# Patient Record
Sex: FEMALE | Race: WHITE | NOT HISPANIC OR LATINO
[De-identification: names, ages, dates, MRNs, and addresses within clinical notes are randomized per-mention and may not be internally consistent; named-entity substitution may affect disease eponyms.]

---

## 2017-01-20 ENCOUNTER — APPOINTMENT (OUTPATIENT)
Dept: INTERNAL MEDICINE | Facility: CLINIC | Age: 77
End: 2017-01-20

## 2017-01-20 ENCOUNTER — LABORATORY RESULT (OUTPATIENT)
Age: 77
End: 2017-01-20

## 2017-01-20 VITALS — TEMPERATURE: 98.2 F | SYSTOLIC BLOOD PRESSURE: 108 MMHG | OXYGEN SATURATION: 97 % | DIASTOLIC BLOOD PRESSURE: 80 MMHG

## 2017-01-20 VITALS — DIASTOLIC BLOOD PRESSURE: 70 MMHG | SYSTOLIC BLOOD PRESSURE: 100 MMHG

## 2017-01-21 LAB
ALBUMIN SERPL ELPH-MCNC: 4.3 G/DL
ALP BLD-CCNC: 71 U/L
ALT SERPL-CCNC: 18 U/L
ANION GAP SERPL CALC-SCNC: 19 MMOL/L
APPEARANCE: CLEAR
AST SERPL-CCNC: 26 U/L
BACTERIA: NEGATIVE
BASOPHILS # BLD AUTO: 0.04 K/UL
BASOPHILS NFR BLD AUTO: 0.7 %
BILIRUB SERPL-MCNC: 0.5 MG/DL
BILIRUBIN URINE: NEGATIVE
BLOOD URINE: NEGATIVE
BUN SERPL-MCNC: 23 MG/DL
CALCIUM SERPL-MCNC: 10.1 MG/DL
CHLORIDE SERPL-SCNC: 99 MMOL/L
CO2 SERPL-SCNC: 21 MMOL/L
COLOR: YELLOW
CREAT SERPL-MCNC: 0.98 MG/DL
EOSINOPHIL # BLD AUTO: 0.13 K/UL
EOSINOPHIL NFR BLD AUTO: 2.2 %
GLUCOSE QUALITATIVE U: NORMAL MG/DL
GLUCOSE SERPL-MCNC: 93 MG/DL
HCT VFR BLD CALC: 47.2 %
HGB BLD-MCNC: 15.5 G/DL
HYALINE CASTS: 1 /LPF
IMM GRANULOCYTES NFR BLD AUTO: 0.3 %
KETONES URINE: NEGATIVE
LEUKOCYTE ESTERASE URINE: NEGATIVE
LYMPHOCYTES # BLD AUTO: 1.42 K/UL
LYMPHOCYTES NFR BLD AUTO: 23.8 %
MAN DIFF?: NORMAL
MCHC RBC-ENTMCNC: 29.2 PG
MCHC RBC-ENTMCNC: 32.8 GM/DL
MCV RBC AUTO: 88.9 FL
MICROSCOPIC-UA: NORMAL
MONOCYTES # BLD AUTO: 0.58 K/UL
MONOCYTES NFR BLD AUTO: 9.7 %
NEUTROPHILS # BLD AUTO: 3.78 K/UL
NEUTROPHILS NFR BLD AUTO: 63.3 %
NITRITE URINE: NEGATIVE
PH URINE: 5.5
PLATELET # BLD AUTO: 262 K/UL
POTASSIUM SERPL-SCNC: 4.5 MMOL/L
PROT SERPL-MCNC: 7.9 G/DL
PROTEIN URINE: NEGATIVE MG/DL
RBC # BLD: 5.31 M/UL
RBC # FLD: 13.5 %
RED BLOOD CELLS URINE: 1 /HPF
SAVE SPECIMEN: NORMAL
SODIUM SERPL-SCNC: 139 MMOL/L
SPECIFIC GRAVITY URINE: 1.02
SQUAMOUS EPITHELIAL CELLS: 1 /HPF
T4 FREE SERPL-MCNC: 1 NG/DL
TSH SERPL-ACNC: 3.6 UIU/ML
UROBILINOGEN URINE: NORMAL MG/DL
WBC # FLD AUTO: 5.97 K/UL
WHITE BLOOD CELLS URINE: 2 /HPF

## 2017-02-02 RX ORDER — ROSUVASTATIN CALCIUM 10 MG/1
10 TABLET, FILM COATED ORAL
Qty: 1 | Refills: 0 | Status: DISCONTINUED | COMMUNITY
Start: 2017-01-20 | End: 2017-02-02

## 2017-06-30 ENCOUNTER — APPOINTMENT (OUTPATIENT)
Dept: ENDOCRINOLOGY | Facility: CLINIC | Age: 77
End: 2017-06-30

## 2017-06-30 VITALS
WEIGHT: 170 LBS | OXYGEN SATURATION: 98 % | SYSTOLIC BLOOD PRESSURE: 100 MMHG | BODY MASS INDEX: 27.32 KG/M2 | HEART RATE: 69 BPM | HEIGHT: 66 IN | DIASTOLIC BLOOD PRESSURE: 66 MMHG

## 2017-06-30 RX ORDER — DENOSUMAB 60 MG/ML
60 INJECTION SUBCUTANEOUS
Qty: 1 | Refills: 0 | Status: COMPLETED | OUTPATIENT
Start: 2017-06-30

## 2017-06-30 RX ADMIN — DENOSUMAB 0 MG/ML: 60 INJECTION SUBCUTANEOUS at 00:00

## 2017-07-27 ENCOUNTER — NON-APPOINTMENT (OUTPATIENT)
Age: 77
End: 2017-07-27

## 2017-07-27 ENCOUNTER — APPOINTMENT (OUTPATIENT)
Dept: INTERNAL MEDICINE | Facility: CLINIC | Age: 77
End: 2017-07-27
Payer: MEDICARE

## 2017-07-27 VITALS
DIASTOLIC BLOOD PRESSURE: 80 MMHG | TEMPERATURE: 97.2 F | WEIGHT: 165 LBS | BODY MASS INDEX: 26.63 KG/M2 | SYSTOLIC BLOOD PRESSURE: 126 MMHG

## 2017-07-27 VITALS — SYSTOLIC BLOOD PRESSURE: 130 MMHG | DIASTOLIC BLOOD PRESSURE: 80 MMHG

## 2017-07-27 PROCEDURE — 99214 OFFICE O/P EST MOD 30 MIN: CPT | Mod: 25

## 2017-07-27 PROCEDURE — 93000 ELECTROCARDIOGRAM COMPLETE: CPT

## 2017-07-27 RX ORDER — CLOBETASOL PROPIONATE 0.5 MG/ML
0.05 SOLUTION TOPICAL
Qty: 50 | Refills: 0 | Status: DISCONTINUED | COMMUNITY
Start: 2017-01-09 | End: 2017-07-27

## 2017-08-12 ENCOUNTER — LABORATORY RESULT (OUTPATIENT)
Age: 77
End: 2017-08-12

## 2017-09-18 LAB — HEMOCCULT STL QL IA: NEGATIVE

## 2017-10-04 ENCOUNTER — APPOINTMENT (OUTPATIENT)
Dept: OBGYN | Facility: CLINIC | Age: 77
End: 2017-10-04
Payer: MEDICARE

## 2017-10-04 PROCEDURE — G0101: CPT

## 2017-10-17 ENCOUNTER — APPOINTMENT (OUTPATIENT)
Dept: INTERNAL MEDICINE | Facility: CLINIC | Age: 77
End: 2017-10-17
Payer: MEDICARE

## 2017-10-17 VITALS — TEMPERATURE: 98.9 F

## 2017-10-17 PROCEDURE — G0008: CPT

## 2017-10-17 PROCEDURE — 90688 IIV4 VACCINE SPLT 0.5 ML IM: CPT

## 2017-11-16 ENCOUNTER — NON-APPOINTMENT (OUTPATIENT)
Age: 77
End: 2017-11-16

## 2017-11-16 ENCOUNTER — APPOINTMENT (OUTPATIENT)
Dept: INTERNAL MEDICINE | Facility: CLINIC | Age: 77
End: 2017-11-16
Payer: MEDICARE

## 2017-11-16 VITALS
WEIGHT: 166 LBS | BODY MASS INDEX: 26.79 KG/M2 | SYSTOLIC BLOOD PRESSURE: 110 MMHG | TEMPERATURE: 97.6 F | DIASTOLIC BLOOD PRESSURE: 80 MMHG

## 2017-11-16 PROCEDURE — 93000 ELECTROCARDIOGRAM COMPLETE: CPT

## 2017-11-16 PROCEDURE — 99214 OFFICE O/P EST MOD 30 MIN: CPT | Mod: 25

## 2017-12-18 ENCOUNTER — APPOINTMENT (OUTPATIENT)
Dept: INTERNAL MEDICINE | Facility: CLINIC | Age: 77
End: 2017-12-18

## 2018-01-17 ENCOUNTER — APPOINTMENT (OUTPATIENT)
Dept: ENDOCRINOLOGY | Facility: CLINIC | Age: 78
End: 2018-01-17
Payer: MEDICARE

## 2018-01-17 VITALS
WEIGHT: 162 LBS | HEIGHT: 65.6 IN | SYSTOLIC BLOOD PRESSURE: 112 MMHG | OXYGEN SATURATION: 98 % | BODY MASS INDEX: 26.35 KG/M2 | HEART RATE: 71 BPM | DIASTOLIC BLOOD PRESSURE: 72 MMHG

## 2018-01-17 PROCEDURE — ZZZZZ: CPT

## 2018-01-17 PROCEDURE — 99214 OFFICE O/P EST MOD 30 MIN: CPT | Mod: 25

## 2018-01-17 PROCEDURE — 96372 THER/PROPH/DIAG INJ SC/IM: CPT

## 2018-01-17 PROCEDURE — 77080 DXA BONE DENSITY AXIAL: CPT | Mod: GA

## 2018-01-17 RX ORDER — DENOSUMAB 60 MG/ML
60 INJECTION SUBCUTANEOUS
Qty: 0 | Refills: 0 | Status: COMPLETED | OUTPATIENT
Start: 2018-01-17

## 2018-01-17 RX ADMIN — DENOSUMAB 0 MG/ML: 60 INJECTION SUBCUTANEOUS at 00:00

## 2018-01-28 ENCOUNTER — FORM ENCOUNTER (OUTPATIENT)
Age: 78
End: 2018-01-28

## 2018-01-29 ENCOUNTER — APPOINTMENT (OUTPATIENT)
Dept: CT IMAGING | Facility: CLINIC | Age: 78
End: 2018-01-29
Payer: MEDICARE

## 2018-01-29 ENCOUNTER — OUTPATIENT (OUTPATIENT)
Dept: OUTPATIENT SERVICES | Facility: HOSPITAL | Age: 78
LOS: 1 days | End: 2018-01-29
Payer: MEDICARE

## 2018-01-29 DIAGNOSIS — R91.1 SOLITARY PULMONARY NODULE: ICD-10-CM

## 2018-01-29 PROCEDURE — 71250 CT THORAX DX C-: CPT | Mod: 26

## 2018-01-29 PROCEDURE — 71250 CT THORAX DX C-: CPT

## 2018-03-12 ENCOUNTER — RESULT CHARGE (OUTPATIENT)
Age: 78
End: 2018-03-12

## 2018-03-12 ENCOUNTER — APPOINTMENT (OUTPATIENT)
Dept: INTERNAL MEDICINE | Facility: CLINIC | Age: 78
End: 2018-03-12
Payer: MEDICARE

## 2018-03-12 VITALS
TEMPERATURE: 98.8 F | WEIGHT: 164 LBS | SYSTOLIC BLOOD PRESSURE: 114 MMHG | HEIGHT: 65 IN | DIASTOLIC BLOOD PRESSURE: 68 MMHG | BODY MASS INDEX: 27.32 KG/M2

## 2018-03-12 DIAGNOSIS — Z87.898 PERSONAL HISTORY OF OTHER SPECIFIED CONDITIONS: ICD-10-CM

## 2018-03-12 DIAGNOSIS — S92.352A DISPLACED FRACTURE OF FIFTH METATARSAL BONE, LEFT FOOT, INITIAL ENCOUNTER FOR CLOSED FRACTURE: ICD-10-CM

## 2018-03-12 DIAGNOSIS — M25.512 PAIN IN LEFT SHOULDER: ICD-10-CM

## 2018-03-12 DIAGNOSIS — Z87.09 PERSONAL HISTORY OF OTHER DISEASES OF THE RESPIRATORY SYSTEM: ICD-10-CM

## 2018-03-12 DIAGNOSIS — I07.1 RHEUMATIC TRICUSPID INSUFFICIENCY: ICD-10-CM

## 2018-03-12 DIAGNOSIS — M25.511 PAIN IN RIGHT SHOULDER: ICD-10-CM

## 2018-03-12 DIAGNOSIS — G51.8 OTHER DISORDERS OF FACIAL NERVE: ICD-10-CM

## 2018-03-12 DIAGNOSIS — Z87.39 PERSONAL HISTORY OF OTHER DISEASES OF THE MUSCULOSKELETAL SYSTEM AND CONNECTIVE TISSUE: ICD-10-CM

## 2018-03-12 DIAGNOSIS — M75.81 OTHER SHOULDER LESIONS, RIGHT SHOULDER: ICD-10-CM

## 2018-03-12 DIAGNOSIS — Z91.041 RADIOGRAPHIC DYE ALLERGY STATUS: ICD-10-CM

## 2018-03-12 DIAGNOSIS — R42 DIZZINESS AND GIDDINESS: ICD-10-CM

## 2018-03-12 DIAGNOSIS — M79.672 PAIN IN LEFT FOOT: ICD-10-CM

## 2018-03-12 DIAGNOSIS — R10.13 EPIGASTRIC PAIN: ICD-10-CM

## 2018-03-12 DIAGNOSIS — Z92.29 PERSONAL HISTORY OF OTHER DRUG THERAPY: ICD-10-CM

## 2018-03-12 DIAGNOSIS — Z86.2 PERSONAL HISTORY OF DISEASES OF THE BLOOD AND BLOOD-FORMING ORGANS AND CERTAIN DISORDERS INVOLVING THE IMMUNE MECHANISM: ICD-10-CM

## 2018-03-12 DIAGNOSIS — M67.912 UNSPECIFIED DISORDER OF SYNOVIUM AND TENDON, LEFT SHOULDER: ICD-10-CM

## 2018-03-12 DIAGNOSIS — Z86.69 PERSONAL HISTORY OF OTHER DISEASES OF THE NERVOUS SYSTEM AND SENSE ORGANS: ICD-10-CM

## 2018-03-12 PROCEDURE — 90471 IMMUNIZATION ADMIN: CPT | Mod: GY

## 2018-03-12 PROCEDURE — 93000 ELECTROCARDIOGRAM COMPLETE: CPT

## 2018-03-12 PROCEDURE — 99214 OFFICE O/P EST MOD 30 MIN: CPT | Mod: 25

## 2018-03-12 PROCEDURE — 36415 COLL VENOUS BLD VENIPUNCTURE: CPT

## 2018-03-12 PROCEDURE — 90715 TDAP VACCINE 7 YRS/> IM: CPT | Mod: GY

## 2018-03-14 LAB
ALBUMIN SERPL ELPH-MCNC: 4.6 G/DL
ALP BLD-CCNC: 60 U/L
ALT SERPL-CCNC: 14 U/L
ANION GAP SERPL CALC-SCNC: 12 MMOL/L
AST SERPL-CCNC: 23 U/L
BASOPHILS # BLD AUTO: 0.04 K/UL
BASOPHILS NFR BLD AUTO: 0.6 %
BILIRUB SERPL-MCNC: 0.5 MG/DL
BUN SERPL-MCNC: 18 MG/DL
CALCIUM SERPL-MCNC: 9.6 MG/DL
CHLORIDE SERPL-SCNC: 104 MMOL/L
CO2 SERPL-SCNC: 25 MMOL/L
CREAT SERPL-MCNC: 0.82 MG/DL
EOSINOPHIL # BLD AUTO: 0.12 K/UL
EOSINOPHIL NFR BLD AUTO: 1.8 %
GLUCOSE SERPL-MCNC: 100 MG/DL
HCT VFR BLD CALC: 44.8 %
HGB BLD-MCNC: 14.8 G/DL
IMM GRANULOCYTES NFR BLD AUTO: 0.2 %
LYMPHOCYTES # BLD AUTO: 1.98 K/UL
LYMPHOCYTES NFR BLD AUTO: 29.9 %
MAN DIFF?: NORMAL
MCHC RBC-ENTMCNC: 29.8 PG
MCHC RBC-ENTMCNC: 33 GM/DL
MCV RBC AUTO: 90.3 FL
MONOCYTES # BLD AUTO: 0.5 K/UL
MONOCYTES NFR BLD AUTO: 7.5 %
NEUTROPHILS # BLD AUTO: 3.98 K/UL
NEUTROPHILS NFR BLD AUTO: 60 %
PLATELET # BLD AUTO: 241 K/UL
POTASSIUM SERPL-SCNC: 4.7 MMOL/L
PROT SERPL-MCNC: 7.6 G/DL
RBC # BLD: 4.96 M/UL
RBC # FLD: 13.7 %
SAVE SPECIMEN: NORMAL
SODIUM SERPL-SCNC: 141 MMOL/L
WBC # FLD AUTO: 6.63 K/UL

## 2018-03-15 ENCOUNTER — MESSAGE (OUTPATIENT)
Age: 78
End: 2018-03-15

## 2018-07-18 ENCOUNTER — APPOINTMENT (OUTPATIENT)
Dept: ENDOCRINOLOGY | Facility: CLINIC | Age: 78
End: 2018-07-18
Payer: MEDICARE

## 2018-07-18 VITALS
HEART RATE: 72 BPM | SYSTOLIC BLOOD PRESSURE: 110 MMHG | DIASTOLIC BLOOD PRESSURE: 70 MMHG | BODY MASS INDEX: 27.99 KG/M2 | OXYGEN SATURATION: 98 % | WEIGHT: 168 LBS | HEIGHT: 65 IN

## 2018-07-18 PROCEDURE — 99214 OFFICE O/P EST MOD 30 MIN: CPT | Mod: 25

## 2018-07-18 PROCEDURE — 96372 THER/PROPH/DIAG INJ SC/IM: CPT

## 2018-07-18 RX ORDER — DENOSUMAB 60 MG/ML
60 INJECTION SUBCUTANEOUS
Qty: 0 | Refills: 0 | Status: COMPLETED | OUTPATIENT
Start: 2018-07-18

## 2018-07-18 RX ORDER — ESOMEPRAZOLE MAGNESIUM 40 MG/1
40 CAPSULE, DELAYED RELEASE ORAL
Qty: 90 | Refills: 3 | Status: DISCONTINUED | COMMUNITY
Start: 2017-07-27 | End: 2018-07-18

## 2018-07-18 RX ADMIN — DENOSUMAB 0 MG/ML: 60 INJECTION SUBCUTANEOUS at 00:00

## 2018-08-27 ENCOUNTER — APPOINTMENT (OUTPATIENT)
Dept: INTERNAL MEDICINE | Facility: CLINIC | Age: 78
End: 2018-08-27
Payer: MEDICARE

## 2018-08-27 ENCOUNTER — NON-APPOINTMENT (OUTPATIENT)
Age: 78
End: 2018-08-27

## 2018-08-27 VITALS — SYSTOLIC BLOOD PRESSURE: 120 MMHG | DIASTOLIC BLOOD PRESSURE: 80 MMHG

## 2018-08-27 VITALS
SYSTOLIC BLOOD PRESSURE: 120 MMHG | TEMPERATURE: 99.1 F | DIASTOLIC BLOOD PRESSURE: 80 MMHG | WEIGHT: 167 LBS | BODY MASS INDEX: 27.79 KG/M2

## 2018-08-27 PROCEDURE — 99214 OFFICE O/P EST MOD 30 MIN: CPT | Mod: 25

## 2018-08-27 PROCEDURE — 93000 ELECTROCARDIOGRAM COMPLETE: CPT

## 2018-09-15 ENCOUNTER — RX RENEWAL (OUTPATIENT)
Age: 78
End: 2018-09-15

## 2019-01-23 ENCOUNTER — APPOINTMENT (OUTPATIENT)
Dept: ENDOCRINOLOGY | Facility: CLINIC | Age: 79
End: 2019-01-23
Payer: MEDICARE

## 2019-01-23 VITALS
HEIGHT: 65 IN | WEIGHT: 169 LBS | SYSTOLIC BLOOD PRESSURE: 110 MMHG | HEART RATE: 75 BPM | BODY MASS INDEX: 28.16 KG/M2 | OXYGEN SATURATION: 98 % | DIASTOLIC BLOOD PRESSURE: 64 MMHG

## 2019-01-23 PROCEDURE — 96372 THER/PROPH/DIAG INJ SC/IM: CPT

## 2019-01-23 PROCEDURE — 99214 OFFICE O/P EST MOD 30 MIN: CPT | Mod: 25

## 2019-01-23 RX ORDER — DENOSUMAB 60 MG/ML
60 INJECTION SUBCUTANEOUS
Qty: 1 | Refills: 0 | Status: COMPLETED | OUTPATIENT
Start: 2019-01-23

## 2019-01-23 RX ADMIN — DENOSUMAB 0 MG/ML: 60 INJECTION SUBCUTANEOUS at 00:00

## 2019-01-23 NOTE — PHYSICAL EXAM
[Alert] : alert [No Acute Distress] : no acute distress [Well Nourished] : well nourished [Well Developed] : well developed [Normal Sclera/Conjunctiva] : normal sclera/conjunctiva [No Proptosis] : no proptosis [Normal Oropharynx] : the oropharynx was normal [Thyroid Not Enlarged] : the thyroid was not enlarged [No Thyroid Nodules] : there were no palpable thyroid nodules [No Respiratory Distress] : no respiratory distress [No Accessory Muscle Use] : no accessory muscle use [Clear to Auscultation] : lungs were clear to auscultation bilaterally [Normal Rate] : heart rate was normal  [Normal S1, S2] : normal S1 and S2 [Regular Rhythm] : with a regular rhythm [Normal Bowel Sounds] : normal bowel sounds [Not Tender] : non-tender [Soft] : abdomen soft [Not Distended] : not distended [Post Cervical Nodes] : posterior cervical nodes [Anterior Cervical Nodes] : anterior cervical nodes [Axillary Nodes] : axillary nodes [No Spinal Tenderness] : no spinal tenderness [Spine Straight] : spine straight [No Stigmata of Cushings Syndrome] : no stigmata of cushings syndrome [Normal Gait] : normal gait [Normal Strength/Tone] : muscle strength and tone were normal [No Rash] : no rash [Normal Reflexes] : deep tendon reflexes were 2+ and symmetric [No Tremors] : no tremors [Oriented x3] : oriented to person, place, and time [de-identified] : arthritic changes to fingers

## 2019-01-23 NOTE — ASSESSMENT
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 \par \par Previously treated with Actonel. Tolerating Prolia since Nov 2011. No UGI sx or thigh pain. Bone mineral density stable, 2016 and 2018 markedly improved vs 2011. Jan 2018 R wrist fx after fall, she was in cast until she had a thumb tendon rupture and surgery April 3rd 2018, followed by PT.  No further fx. c/o shoulder pain. \par \par Continue Prolia buy and bill. \par I request labs sent out today. \par \par f/u in 6 mons. \par   [Denosumab Therapy] : Risks  and benefits of denosumab therapy were discussed with the patient including eczema, cellulitis, osteonecrosis of the jaw and atypical femur fractures

## 2019-01-23 NOTE — HISTORY OF PRESENT ILLNESS
[Risedronate (Actonel)] : Risedronate [Vitamin D (supplements)] : Vitamin D as a dietary supplement [Prolia (Denosumab)] : Prolia (Denosumab) [Patient taking Meds Correctly] : Patient is taking meds correctly [FreeTextEntry1] : f/u 77 y/o female with osteoporosis\par \par Prior Actonel use for many years.On Prolia since Nov 2011. Tolerating well. No UGI sx or thigh pain. Last DDS 6 mon ago, no complications. No ONJ. Some wt gain. Not exercising. BMD 12/2015 improved. BMD 2016 and 2018 stable. Jan 2018 R wrist fx after fall on broken concrete, in cast until she had a thumb tendon rupture and surgery April 3rd 2018, followed by PT. \par \par c/o shoulder joint pain. \par \par \par

## 2019-01-23 NOTE — PROCEDURE
[FreeTextEntry1] : Bone mineral density 1/17/18\par indication: compare to 2015 \par spine n/a \par total hip -2.3 osteopenia, no significant change , +11.6% vs 2011\par femoral neck -2.6 osteoporosis, no significant change vs 2015, +10.0% vs 2011\par proximal radius -1.9 osteopenia, no significant change +4.7% vs 2011\par \par bone mineral density test performed 12/11/15\par Spine -0.9, normal,  +3.5% vs 2013\par Total hip -2.3, osteopenia, +5.6% versus 2013\par femoral neck -2.4, osteopenia, 6.8% versus 2013\par Proximal radius -1.9 , osteopenia, No significant change versus 2013

## 2019-01-23 NOTE — REVIEW OF SYSTEMS
[Headache] : headaches [Negative] : Heme/Lymph [Joint Pain] : joint pain [Recent Weight Gain (___ Lbs)] : no recent weight gain [FreeTextEntry5] : HTN

## 2019-01-23 NOTE — END OF VISIT
[FreeTextEntry3] : I, Milka Kohli, authored this note working as a medical scribe for Dr. Walls.  01/23/2019. 11:00AM. \par This note was authored by Milka Kohli working as medical scribe for me. I have reviewed, edited, and revised the note as needed. I am in agreement with the exam findings, imaging findings, and treatment plan.  Brian Walls MD

## 2019-01-25 LAB
ALBUMIN SERPL ELPH-MCNC: 4.2 G/DL
ALP BLD-CCNC: 63 U/L
ALT SERPL-CCNC: 12 U/L
ANION GAP SERPL CALC-SCNC: 15 MMOL/L
AST SERPL-CCNC: 20 U/L
BILIRUB SERPL-MCNC: 0.3 MG/DL
BUN SERPL-MCNC: 19 MG/DL
CALCIUM SERPL-MCNC: 9.8 MG/DL
CHLORIDE SERPL-SCNC: 105 MMOL/L
CO2 SERPL-SCNC: 23 MMOL/L
CREAT SERPL-MCNC: 0.87 MG/DL
GLUCOSE SERPL-MCNC: 86 MG/DL
POTASSIUM SERPL-SCNC: 4.9 MMOL/L
PROT SERPL-MCNC: 6.4 G/DL
SODIUM SERPL-SCNC: 143 MMOL/L

## 2019-02-05 ENCOUNTER — RX RENEWAL (OUTPATIENT)
Age: 79
End: 2019-02-05

## 2019-03-11 ENCOUNTER — APPOINTMENT (OUTPATIENT)
Dept: ORTHOPEDIC SURGERY | Facility: CLINIC | Age: 79
End: 2019-03-11

## 2019-03-18 ENCOUNTER — APPOINTMENT (OUTPATIENT)
Dept: ORTHOPEDIC SURGERY | Facility: CLINIC | Age: 79
End: 2019-03-18
Payer: MEDICARE

## 2019-03-18 VITALS — WEIGHT: 165 LBS | BODY MASS INDEX: 26.2 KG/M2 | HEIGHT: 66.5 IN

## 2019-03-18 DIAGNOSIS — Z86.39 PERSONAL HISTORY OF OTHER ENDOCRINE, NUTRITIONAL AND METABOLIC DISEASE: ICD-10-CM

## 2019-03-18 DIAGNOSIS — Z78.9 OTHER SPECIFIED HEALTH STATUS: ICD-10-CM

## 2019-03-18 PROCEDURE — 99204 OFFICE O/P NEW MOD 45 MIN: CPT

## 2019-03-18 PROCEDURE — 99214 OFFICE O/P EST MOD 30 MIN: CPT

## 2019-03-19 PROBLEM — Z86.39 HISTORY OF HYPERLIPIDEMIA: Status: RESOLVED | Noted: 2019-03-19 | Resolved: 2019-03-19

## 2019-03-19 PROBLEM — Z78.9 ALCOHOL USE: Status: ACTIVE | Noted: 2019-03-19

## 2019-03-19 NOTE — HISTORY OF PRESENT ILLNESS
[de-identified] : This 78-year-old woman is referred by Dr. Vlad Mead for evaluation of spine-related complaints. 6 weeks ago she had the spontaneous onset of pain in the left upper arm quickly followed by neck, upper back and bilateral shoulder pain. There is no history of injury. She denies a history of prior neck problems. The neck pain is intermittent and can be as bad as a 7 or an 8. The shoulder pains are graded as a 9. Her symptoms are worse at night. She denies associated neurologic symptoms of numbness, paresthesias or weakness and there is no Valsalva effect. There have been no recent changes in her gait or balance. She relates she has been slightly unsteady on her feet for the last year. The only treatment to date has been Tylenol and aspirin. She has had cervical spine and shoulder x-ray  by Dr. Mead. She has a history of osteoporosis and is being treated with Prolia. She is a former smoker and has 1-2 drinks a day. [Pain Location] : pain [9] : a current pain level of 9/10

## 2019-03-19 NOTE — PHYSICAL EXAM
[de-identified] : She is fully alert and oriented with a normal mood and affect. She is in no acute distress. She ambulates with a normal gait including tiptoe and heel walking. There are no cutaneous abnormalities or palpable bony defects of the spine. There is no evidence of shortness of breath or respiratory distress. Her lower extremity neurological examination revealed 1-2+ symmetrical reflexes with normal motor power and sensation. Her toes are downgoing. Straight leg raising is negative to 90°. There is no paravertebral muscle spasm, sciatic notch tenderness or trochanteric tenderness. Vascular exam shows no evidence of varicosities and there is no lymphedema. Her knees have a full range of motion with normal stability. There are no cutaneous abnormalities of the upper or lower extremities. There is significant discomfort with range of motion of both shoulders, both of which are mildly limited. There is discomfort with range of motion of the cervical spine but it is normal for her age. Her upper extremity neurological examination revealed 1-2+ symmetrical reflexes weren't mild suppression of the left triceps reflex. Motor and sensory exam is normal. [de-identified] : I reviewed outside x-rays of the cervical spine which looked amazingly normal for her age. Sagittal alignment is normal and there are no destructive changes. X-rays of both shoulders also show no evidence of any bony abnormality. The x-rays do confirm some mild osteoporosis. There are no destructive changes.

## 2019-03-19 NOTE — DISCUSSION/SUMMARY
[Medication Risks Reviewed] : Medication risks reviewed [de-identified] : Her x-rays look surprisingly good and her symptoms are suggestive of polymyalgia rheumatica. She's been sent for CBC with sedimentation rate and a C-reactive protein and I have prescribed a Medrol Dosepak. I await the results of her blood tests.If her blood work is normal she will be started on a nonsteroidal anti-inflammatory after she finishes the Medrol Dosepak.

## 2019-03-19 NOTE — REASON FOR VISIT
[Initial Visit] : an initial visit for [Back Pain] : back pain [Neck Pain] : neck pain [FreeTextEntry2] : Next, upper back and bilateral shoulder pain

## 2019-03-19 NOTE — CONSULT LETTER
[Dear  ___] : Dear  [unfilled], [Sincerely,] : Sincerely, [Please see my note below.] : Please see my note below. [FreeTextEntry1] : Thank you for referring this woman for evaluation of her current upper back and shoulder symptoms.

## 2019-04-08 ENCOUNTER — APPOINTMENT (OUTPATIENT)
Dept: ORTHOPEDIC SURGERY | Facility: CLINIC | Age: 79
End: 2019-04-08
Payer: MEDICARE

## 2019-04-08 PROCEDURE — 99213 OFFICE O/P EST LOW 20 MIN: CPT

## 2019-04-08 NOTE — DISCUSSION/SUMMARY
[Medication Risks Reviewed] : Medication risks reviewed [de-identified] : She is to switch to diclofenac 75 mg twice a day. I will see her for followup in 3 weeks.

## 2019-04-08 NOTE — REASON FOR VISIT
[Follow-Up Visit] : a follow-up visit for [Neck Pain] : neck pain [Radiculopathy] : radiculopathy [FreeTextEntry2] : Neck pain with radiation to the upper extremities

## 2019-04-08 NOTE — HISTORY OF PRESENT ILLNESS
[de-identified] : She relates that the major pain she had was dramatically improved with the Medrol Dosepak it when she grades the pain is only slightly better. Initially when asked to describe the arm symptoms she related that they were only of discomfort and then said the pain was a 7 or an 8. She has been tolerating 2 Aleve twice a day without a problem. [Improving] : improving

## 2019-04-29 ENCOUNTER — APPOINTMENT (OUTPATIENT)
Dept: ORTHOPEDIC SURGERY | Facility: CLINIC | Age: 79
End: 2019-04-29
Payer: MEDICARE

## 2019-04-29 VITALS
BODY MASS INDEX: 26.2 KG/M2 | HEIGHT: 66.5 IN | WEIGHT: 165 LBS | HEART RATE: 73 BPM | DIASTOLIC BLOOD PRESSURE: 79 MMHG | SYSTOLIC BLOOD PRESSURE: 152 MMHG

## 2019-04-29 PROCEDURE — 99214 OFFICE O/P EST MOD 30 MIN: CPT

## 2019-04-29 NOTE — PHYSICAL EXAM
[de-identified] : Lower extremity reflexes remain 1-2+. Upper extremity motor power is normal in all groups. She does have arthritic changes in both hands.

## 2019-04-29 NOTE — HISTORY OF PRESENT ILLNESS
[de-identified] : She stopped the Voltaren after only 7-10 days as she felt it was not helping. She continues with discomfort in the arms worse on the left than the right during the day and pain in the arms at night.

## 2019-04-29 NOTE — DISCUSSION/SUMMARY
[Medication Risks Reviewed] : Medication risks reviewed [de-identified] : She will  repeat a Medrol Dosepak and then resume the diclofenac 75 mg twice a day. I will see her for followup in 3 weeks.

## 2019-04-30 ENCOUNTER — NON-APPOINTMENT (OUTPATIENT)
Age: 79
End: 2019-04-30

## 2019-04-30 ENCOUNTER — APPOINTMENT (OUTPATIENT)
Dept: INTERNAL MEDICINE | Facility: CLINIC | Age: 79
End: 2019-04-30
Payer: MEDICARE

## 2019-04-30 VITALS
TEMPERATURE: 97.1 F | WEIGHT: 170 LBS | DIASTOLIC BLOOD PRESSURE: 90 MMHG | BODY MASS INDEX: 27.03 KG/M2 | SYSTOLIC BLOOD PRESSURE: 112 MMHG

## 2019-04-30 VITALS — SYSTOLIC BLOOD PRESSURE: 130 MMHG | DIASTOLIC BLOOD PRESSURE: 80 MMHG

## 2019-04-30 PROCEDURE — 93000 ELECTROCARDIOGRAM COMPLETE: CPT

## 2019-04-30 PROCEDURE — 99214 OFFICE O/P EST MOD 30 MIN: CPT | Mod: 25

## 2019-04-30 RX ORDER — METHYLPREDNISOLONE 4 MG/1
4 TABLET ORAL
Qty: 21 | Refills: 0 | Status: DISCONTINUED | COMMUNITY
Start: 2019-04-29 | End: 2019-04-30

## 2019-04-30 NOTE — PHYSICAL EXAM
[General Appearance - Well Developed] : well developed [Normal Appearance] : normal appearance [Well Groomed] : well groomed [General Appearance - Well Nourished] : well nourished [No Deformities] : no deformities [General Appearance - In No Acute Distress] : no acute distress [Normal Conjunctiva] : the conjunctiva exhibited no abnormalities [Eyelids - No Xanthelasma] : the eyelids demonstrated no xanthelasmas [Normal Oral Mucosa] : normal oral mucosa [No Oral Pallor] : no oral pallor [No Oral Cyanosis] : no oral cyanosis [Normal Jugular Venous A Waves Present] : normal jugular venous A waves present [Normal Jugular Venous V Waves Present] : normal jugular venous V waves present [No Jugular Venous Dow A Waves] : no jugular venous dow A waves [Respiration, Rhythm And Depth] : normal respiratory rhythm and effort [Exaggerated Use Of Accessory Muscles For Inspiration] : no accessory muscle use [Auscultation Breath Sounds / Voice Sounds] : lungs were clear to auscultation bilaterally [Heart Rate And Rhythm] : heart rate and rhythm were normal [Heart Sounds] : normal S1 and S2 [Murmurs] : no murmurs present [Abdomen Soft] : soft [Abdomen Tenderness] : non-tender [Abdomen Mass (___ Cm)] : no abdominal mass palpated [Abnormal Walk] : normal gait [Gait - Sufficient For Exercise Testing] : the gait was sufficient for exercise testing [Nail Clubbing] : no clubbing of the fingernails [Cyanosis, Localized] : no localized cyanosis [Petechial Hemorrhages (___cm)] : no petechial hemorrhages [] : no ischemic changes

## 2019-04-30 NOTE — ASSESSMENT
[FreeTextEntry1] : \par \par \par ecg---SR; WNL\par \par imp---chest pain---atypical with nonocclusive CAD (v.i.)\par           dilation TA---ascending aorta=4.6 cm on last CT chest, Jan. 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 interfering with sleep\par           fatigue---mulitifactorial ; s.o. (Steven Clinco) on CTX for met. lung ca; not doing well\par \par plan---FBW:   CBC, TFTs; CMP, lipids, LDL\par            CT chest ---noncontrast; reassess TA\par            neuro consult\par            OV 3-4 mos.\par

## 2019-04-30 NOTE — REASON FOR VISIT
[Follow-Up - Clinic] : a clinic follow-up of [Chest Pain] : chest pain [Hyperlipidemia] : hyperlipidemia [FreeTextEntry1] : \par \par \par being txd by spine orthopedics for pain in neck and  in both arms, left worse than right;\par medrol dospak helped; is taking another course; disturbs sleep\par \par no chest pain; has not used NTG

## 2019-05-03 ENCOUNTER — FORM ENCOUNTER (OUTPATIENT)
Age: 79
End: 2019-05-03

## 2019-05-04 ENCOUNTER — APPOINTMENT (OUTPATIENT)
Dept: CT IMAGING | Facility: CLINIC | Age: 79
End: 2019-05-04
Payer: MEDICARE

## 2019-05-04 ENCOUNTER — OUTPATIENT (OUTPATIENT)
Dept: OUTPATIENT SERVICES | Facility: HOSPITAL | Age: 79
LOS: 1 days | End: 2019-05-04
Payer: MEDICARE

## 2019-05-04 DIAGNOSIS — I77.810 THORACIC AORTIC ECTASIA: ICD-10-CM

## 2019-05-04 PROCEDURE — 71250 CT THORAX DX C-: CPT | Mod: 26

## 2019-05-04 PROCEDURE — 71250 CT THORAX DX C-: CPT

## 2019-05-20 ENCOUNTER — APPOINTMENT (OUTPATIENT)
Dept: ORTHOPEDIC SURGERY | Facility: CLINIC | Age: 79
End: 2019-05-20
Payer: MEDICARE

## 2019-05-20 PROCEDURE — 99214 OFFICE O/P EST MOD 30 MIN: CPT

## 2019-05-20 NOTE — PHYSICAL EXAM
[de-identified] : On exam there is pain with full pronation and supination of the right wrist. There is pain with limited flexion and extension of the right wrist. I do not think the right arm symptoms are related to her neck.

## 2019-05-20 NOTE — HISTORY OF PRESENT ILLNESS
[de-identified] : She did not have problems tolerating the Medrol Dosepak and has not had problems tolerating the diclofenac 75 mg twice a day. The neck symptoms are significantly improved. They are currently intermittent and no worse than a very short lived mild pain. She does get some symptoms in the area of the left triceps. She is having some pain with range of motion of the right wrist where she had a fracture in January of 2018.

## 2019-05-20 NOTE — DISCUSSION/SUMMARY
[Medication Risks Reviewed] : Medication risks reviewed [de-identified] : She will finished the diclofenac 75 mg twice a day. She has about a week of medication left. She was on low dose to 50 mg twice a day and I will see her for followup in 4 weeks.

## 2019-05-21 ENCOUNTER — LABORATORY RESULT (OUTPATIENT)
Age: 79
End: 2019-05-21

## 2019-06-05 ENCOUNTER — APPOINTMENT (OUTPATIENT)
Dept: INTERNAL MEDICINE | Facility: CLINIC | Age: 79
End: 2019-06-05
Payer: MEDICARE

## 2019-06-05 VITALS
HEIGHT: 66.5 IN | WEIGHT: 177 LBS | DIASTOLIC BLOOD PRESSURE: 80 MMHG | SYSTOLIC BLOOD PRESSURE: 128 MMHG | TEMPERATURE: 98.2 F | BODY MASS INDEX: 28.11 KG/M2

## 2019-06-05 DIAGNOSIS — Z86.79 PERSONAL HISTORY OF OTHER DISEASES OF THE CIRCULATORY SYSTEM: ICD-10-CM

## 2019-06-05 DIAGNOSIS — S62.101G: ICD-10-CM

## 2019-06-05 DIAGNOSIS — Z23 ENCOUNTER FOR IMMUNIZATION: ICD-10-CM

## 2019-06-05 DIAGNOSIS — M54.12 RADICULOPATHY, CERVICAL REGION: ICD-10-CM

## 2019-06-05 DIAGNOSIS — Z87.39 PERSONAL HISTORY OF OTHER DISEASES OF THE MUSCULOSKELETAL SYSTEM AND CONNECTIVE TISSUE: ICD-10-CM

## 2019-06-05 DIAGNOSIS — Z87.898 PERSONAL HISTORY OF OTHER SPECIFIED CONDITIONS: ICD-10-CM
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
## 2019-06-05 DIAGNOSIS — M25.531 PAIN IN RIGHT WRIST: ICD-10-CM

## 2019-06-05 PROCEDURE — 99214 OFFICE O/P EST MOD 30 MIN: CPT

## 2019-06-17 ENCOUNTER — APPOINTMENT (OUTPATIENT)
Dept: ORTHOPEDIC SURGERY | Facility: CLINIC | Age: 79
End: 2019-06-17

## 2019-06-17 PROBLEM — Z87.898 HISTORY OF CHEST PAIN: Status: RESOLVED | Noted: 2018-08-27 | Resolved: 2019-06-17

## 2019-06-17 PROBLEM — Z23 NEED FOR TDAP VACCINATION: Status: RESOLVED | Noted: 2018-03-12 | Resolved: 2019-06-17

## 2019-06-17 PROBLEM — Z87.898 HISTORY OF FATIGUE: Status: RESOLVED | Noted: 2019-04-30 | Resolved: 2019-06-17

## 2019-06-17 PROBLEM — M54.12 CERVICAL RADICULOPATHY: Status: RESOLVED | Noted: 2019-04-29 | Resolved: 2019-06-17

## 2019-06-17 PROBLEM — S62.101G: Status: RESOLVED | Noted: 2018-03-12 | Resolved: 2019-06-17

## 2019-06-17 PROBLEM — Z87.39 HISTORY OF NECK PAIN: Status: RESOLVED | Noted: 2019-03-18 | Resolved: 2019-06-17

## 2019-06-17 PROBLEM — M25.531 RIGHT WRIST PAIN: Status: RESOLVED | Noted: 2019-05-20 | Resolved: 2019-06-17
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
## 2019-06-28 RX ORDER — DICLOFENAC SODIUM 75 MG/1
75 TABLET, DELAYED RELEASE ORAL
Qty: 60 | Refills: 0 | Status: DISCONTINUED | COMMUNITY
Start: 2019-04-08 | End: 2019-06-28

## 2019-06-28 RX ORDER — METHYLPREDNISOLONE 4 MG/1
4 TABLET ORAL
Qty: 21 | Refills: 0 | Status: DISCONTINUED | COMMUNITY
Start: 2019-03-18 | End: 2019-06-28

## 2019-07-22 ENCOUNTER — APPOINTMENT (OUTPATIENT)
Dept: INTERNAL MEDICINE | Facility: CLINIC | Age: 79
End: 2019-07-22
Payer: MEDICARE

## 2019-07-22 VITALS
BODY MASS INDEX: 26.84 KG/M2 | SYSTOLIC BLOOD PRESSURE: 140 MMHG | TEMPERATURE: 97.6 F | DIASTOLIC BLOOD PRESSURE: 88 MMHG | HEIGHT: 66.5 IN | WEIGHT: 169 LBS

## 2019-07-22 DIAGNOSIS — H26.9 UNSPECIFIED CATARACT: ICD-10-CM

## 2019-07-22 PROCEDURE — 99213 OFFICE O/P EST LOW 20 MIN: CPT

## 2019-07-31 ENCOUNTER — APPOINTMENT (OUTPATIENT)
Dept: ENDOCRINOLOGY | Facility: CLINIC | Age: 79
End: 2019-07-31
Payer: MEDICARE

## 2019-07-31 VITALS
BODY MASS INDEX: 26.84 KG/M2 | SYSTOLIC BLOOD PRESSURE: 130 MMHG | WEIGHT: 169 LBS | OXYGEN SATURATION: 98 % | HEART RATE: 74 BPM | HEIGHT: 66.5 IN | DIASTOLIC BLOOD PRESSURE: 80 MMHG

## 2019-07-31 PROCEDURE — 99214 OFFICE O/P EST MOD 30 MIN: CPT | Mod: 25

## 2019-07-31 PROCEDURE — 96372 THER/PROPH/DIAG INJ SC/IM: CPT

## 2019-07-31 RX ORDER — DENOSUMAB 60 MG/ML
60 INJECTION SUBCUTANEOUS
Qty: 1 | Refills: 0 | Status: COMPLETED | OUTPATIENT
Start: 2019-07-31

## 2019-07-31 RX ADMIN — DENOSUMAB 60 MG/ML: 60 INJECTION SUBCUTANEOUS at 00:00

## 2019-08-01 NOTE — HISTORY OF PRESENT ILLNESS
[Risedronate (Actonel)] : Risedronate [Vitamin D (supplements)] : Vitamin D as a dietary supplement [Prolia (Denosumab)] : Prolia (Denosumab) [Patient taking Meds Correctly] : Patient is taking meds correctly [FreeTextEntry1] : f/u 79 y/o female with osteoporosis\par \par Prior Actonel use for many years.On Prolia since Nov 2011. Tolerating well. No UGI sx or thigh pain. Last DDS ~6 mon ago, no complications. No ONJ. Not exercising. BMD 12/2015 improved. BMD 2016 and 2018 stable. Jan 2018 R wrist fx after fall on broken concrete, in cast until she had a thumb tendon rupture and surgery April 3rd 2018, followed by PT. Pt does not have any new fx. \par \par Pt also had recent cataract surgery. C/o blurry vision.

## 2019-08-01 NOTE — REVIEW OF SYSTEMS
[Headache] : headaches [Joint Pain] : joint pain [Negative] : Heme/Lymph [FreeTextEntry5] : HTN [Recent Weight Gain (___ Lbs)] : no recent weight gain

## 2019-08-01 NOTE — PHYSICAL EXAM
[Alert] : alert [Well Nourished] : well nourished [No Acute Distress] : no acute distress [Normal Sclera/Conjunctiva] : normal sclera/conjunctiva [Well Developed] : well developed [Normal Oropharynx] : the oropharynx was normal [Thyroid Not Enlarged] : the thyroid was not enlarged [No Proptosis] : no proptosis [No Respiratory Distress] : no respiratory distress [No Thyroid Nodules] : there were no palpable thyroid nodules [No Accessory Muscle Use] : no accessory muscle use [Clear to Auscultation] : lungs were clear to auscultation bilaterally [Normal Rate] : heart rate was normal  [Normal S1, S2] : normal S1 and S2 [Regular Rhythm] : with a regular rhythm [Normal Bowel Sounds] : normal bowel sounds [Not Tender] : non-tender [Soft] : abdomen soft [Not Distended] : not distended [Anterior Cervical Nodes] : anterior cervical nodes [Axillary Nodes] : axillary nodes [Post Cervical Nodes] : posterior cervical nodes [Spine Straight] : spine straight [No Spinal Tenderness] : no spinal tenderness [No Stigmata of Cushings Syndrome] : no stigmata of cushings syndrome [Normal Gait] : normal gait [Normal Strength/Tone] : muscle strength and tone were normal [No Rash] : no rash [No Tremors] : no tremors [Normal Reflexes] : deep tendon reflexes were 2+ and symmetric [Oriented x3] : oriented to person, place, and time [de-identified] : arthritic changes to fingers

## 2019-08-01 NOTE — END OF VISIT
[FreeTextEntry3] : I, Tricia Colbert, authored this note working as a medical scribe for Dr. Walls.  07/31/2019. 11:30AM. This note was authored by the medical scribe for me. I have reviewed, edited, and revised the note as needed. I am in agreement with the exam findings, imaging findings, and treatment plan.  Brian Walls MD

## 2019-08-01 NOTE — ASSESSMENT
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 \par \par Previously treated with Actonel. Tolerating Prolia since Nov 2011. No UGI sx or thigh pain. Bone mineral density stable, 2016 and 2018 markedly improved vs 2011. Jan 2018 R wrist fx after fall, she was in cast until she had a thumb tendon rupture and surgery April 3rd 2018, followed by PT.  No further fx. Labs done 5/2019, Ca normal 9.1.\par \par I discussed that pt can not stop Prolia without expecting rapid bone loss and increase in risk for future fx. Further, there is 10 year safety data for Prolia. Pt would have to transition treatment to benefit from a future drug holiday. Discussed Carrion; pt is not a candidate as she is neither high risk or failing standard care. All questions answered. \par \par Continue Prolia buy and bill. \par \par f/u in 6 mons with repeat BMD.

## 2019-08-05 ENCOUNTER — APPOINTMENT (OUTPATIENT)
Dept: INTERNAL MEDICINE | Facility: CLINIC | Age: 79
End: 2019-08-05
Payer: MEDICARE

## 2019-08-05 VITALS
SYSTOLIC BLOOD PRESSURE: 122 MMHG | HEIGHT: 66 IN | BODY MASS INDEX: 27.16 KG/M2 | TEMPERATURE: 97 F | HEART RATE: 69 BPM | OXYGEN SATURATION: 97 % | DIASTOLIC BLOOD PRESSURE: 70 MMHG | WEIGHT: 169 LBS

## 2019-08-05 VITALS — TEMPERATURE: 97 F

## 2019-08-05 VITALS — DIASTOLIC BLOOD PRESSURE: 70 MMHG | SYSTOLIC BLOOD PRESSURE: 130 MMHG

## 2019-08-05 PROCEDURE — 99214 OFFICE O/P EST MOD 30 MIN: CPT | Mod: 25

## 2019-08-05 PROCEDURE — 93000 ELECTROCARDIOGRAM COMPLETE: CPT

## 2019-08-05 NOTE — REASON FOR VISIT
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

## 2019-08-05 NOTE — PHYSICAL EXAM
[General Appearance - Well Developed] : well developed [Normal Appearance] : normal appearance [General Appearance - Well Nourished] : well nourished [Well Groomed] : well groomed [No Deformities] : no deformities [General Appearance - In No Acute Distress] : no acute distress [Normal Conjunctiva] : the conjunctiva exhibited no abnormalities [Eyelids - No Xanthelasma] : the eyelids demonstrated no xanthelasmas [Normal Oral Mucosa] : normal oral mucosa [No Oral Pallor] : no oral pallor [No Oral Cyanosis] : no oral cyanosis [Normal Jugular Venous A Waves Present] : normal jugular venous A waves present [Normal Jugular Venous V Waves Present] : normal jugular venous V waves present [No Jugular Venous Dow A Waves] : no jugular venous dow A waves [Respiration, Rhythm And Depth] : normal respiratory rhythm and effort [Exaggerated Use Of Accessory Muscles For Inspiration] : no accessory muscle use [Auscultation Breath Sounds / Voice Sounds] : lungs were clear to auscultation bilaterally [Heart Rate And Rhythm] : heart rate and rhythm were normal [Heart Sounds] : normal S1 and S2 [Murmurs] : no murmurs present [Abdomen Soft] : soft [Abdomen Tenderness] : non-tender [Abdomen Mass (___ Cm)] : no abdominal mass palpated [Abnormal Walk] : normal gait [Gait - Sufficient For Exercise Testing] : the gait was sufficient for exercise testing [Nail Clubbing] : no clubbing of the fingernails [Cyanosis, Localized] : no localized cyanosis [Petechial Hemorrhages (___cm)] : no petechial hemorrhages [] : no ischemic changes

## 2019-08-05 NOTE — ASSESSMENT
[FreeTextEntry1] : \par \par ecg---SR; WNL\par \par imp---hyperlipidemia---good panel on statin (most recent bw)\par           dilation TA===stable at 4.2 cm per CT chest earlier this year\par           pulmonary nodules; stable on above chest CT\par           grieving---see above\par \par plan---support given\par            continue current meds\par            CPE 3 mos\par

## 2019-08-06 ENCOUNTER — NON-APPOINTMENT (OUTPATIENT)
Age: 79
End: 2019-08-06

## 2019-08-06 RX ORDER — DICLOFENAC SODIUM 50 MG/1
50 TABLET, DELAYED RELEASE ORAL
Qty: 60 | Refills: 0 | Status: DISCONTINUED | COMMUNITY
Start: 2019-05-20 | End: 2019-08-06

## 2019-10-07 ENCOUNTER — FORM ENCOUNTER (OUTPATIENT)
Age: 79
End: 2019-10-07

## 2019-10-08 ENCOUNTER — FORM ENCOUNTER (OUTPATIENT)
Age: 79
End: 2019-10-08

## 2019-10-08 ENCOUNTER — APPOINTMENT (OUTPATIENT)
Dept: OBGYN | Facility: CLINIC | Age: 79
End: 2019-10-08
Payer: MEDICARE

## 2019-10-08 PROCEDURE — G0101: CPT

## 2019-12-01 ENCOUNTER — RESULT CHARGE (OUTPATIENT)
Age: 79
End: 2019-12-01

## 2019-12-02 ENCOUNTER — APPOINTMENT (OUTPATIENT)
Dept: INTERNAL MEDICINE | Facility: CLINIC | Age: 79
End: 2019-12-02
Payer: MEDICARE

## 2019-12-02 VITALS
SYSTOLIC BLOOD PRESSURE: 128 MMHG | WEIGHT: 173 LBS | HEIGHT: 66 IN | BODY MASS INDEX: 27.8 KG/M2 | DIASTOLIC BLOOD PRESSURE: 74 MMHG

## 2019-12-02 VITALS — SYSTOLIC BLOOD PRESSURE: 120 MMHG | DIASTOLIC BLOOD PRESSURE: 80 MMHG

## 2019-12-02 PROCEDURE — 99214 OFFICE O/P EST MOD 30 MIN: CPT | Mod: 25

## 2019-12-02 PROCEDURE — 36415 COLL VENOUS BLD VENIPUNCTURE: CPT

## 2019-12-02 PROCEDURE — 93000 ELECTROCARDIOGRAM COMPLETE: CPT

## 2019-12-02 NOTE — PHYSICAL EXAM
[Normal Appearance] : normal appearance [General Appearance - Well Developed] : well developed [Well Groomed] : well groomed [General Appearance - Well Nourished] : well nourished [General Appearance - In No Acute Distress] : no acute distress [No Deformities] : no deformities [Normal Conjunctiva] : the conjunctiva exhibited no abnormalities [Eyelids - No Xanthelasma] : the eyelids demonstrated no xanthelasmas [Normal Oral Mucosa] : normal oral mucosa [No Oral Pallor] : no oral pallor [No Oral Cyanosis] : no oral cyanosis [Normal Jugular Venous A Waves Present] : normal jugular venous A waves present [Normal Jugular Venous V Waves Present] : normal jugular venous V waves present [No Jugular Venous Dow A Waves] : no jugular venous dow A waves [Respiration, Rhythm And Depth] : normal respiratory rhythm and effort [Exaggerated Use Of Accessory Muscles For Inspiration] : no accessory muscle use [Auscultation Breath Sounds / Voice Sounds] : lungs were clear to auscultation bilaterally [Abdomen Tenderness] : non-tender [Abdomen Soft] : soft [Abdomen Mass (___ Cm)] : no abdominal mass palpated [Abnormal Walk] : normal gait [Gait - Sufficient For Exercise Testing] : the gait was sufficient for exercise testing [Nail Clubbing] : no clubbing of the fingernails [Cyanosis, Localized] : no localized cyanosis [Petechial Hemorrhages (___cm)] : no petechial hemorrhages [] : no ischemic changes

## 2019-12-02 NOTE — ASSESSMENT
[FreeTextEntry1] : \par \par ecg---SR; WNL\par \par imp---COPD---ex. cig. smoker; emphysema on chest CTs; asx.\par           pulmonary nodules--? minimal enlargement of one of the nodules on CT of May 2019 c/w 2016\par           dilation TA===4.2cm on last CT\par           hyperlipidemia=-==panel to be checked on statin\par \par plan--FBW:   CMP, lipids, LDL\par          CPE 3-4 mos\par          f/u noncontrast CT in May of 2020\par          rx given for Shingrix\par          revaccination with Pneumovax. next OV\par     \par

## 2019-12-02 NOTE — REASON FOR VISIT
[Hyperlipidemia] : hyperlipidemia [Follow-Up - Clinic] : a clinic follow-up of [FreeTextEntry1] : \par \par generally well;\par no cardio-resp. sx.\par \par CTs being followed for pulm. nodules\par \par SH---s.o. passed away in June.

## 2019-12-03 LAB
ALBUMIN SERPL ELPH-MCNC: 4 G/DL
ALP BLD-CCNC: 45 U/L
ALT SERPL-CCNC: 19 U/L
ANION GAP SERPL CALC-SCNC: 12 MMOL/L
AST SERPL-CCNC: 26 U/L
BASOPHILS # BLD AUTO: 0.05 K/UL
BASOPHILS NFR BLD AUTO: 1.2 %
BILIRUB SERPL-MCNC: 0.3 MG/DL
BUN SERPL-MCNC: 22 MG/DL
CALCIUM SERPL-MCNC: 8.9 MG/DL
CHLORIDE SERPL-SCNC: 108 MMOL/L
CHOLEST SERPL-MCNC: 200 MG/DL
CHOLEST/HDLC SERPL: 2.3 RATIO
CO2 SERPL-SCNC: 23 MMOL/L
CREAT SERPL-MCNC: 0.81 MG/DL
EOSINOPHIL # BLD AUTO: 0.09 K/UL
EOSINOPHIL NFR BLD AUTO: 2.1 %
GLUCOSE SERPL-MCNC: 84 MG/DL
HCT VFR BLD CALC: 46.4 %
HDLC SERPL-MCNC: 89 MG/DL
HGB BLD-MCNC: 14.4 G/DL
IMM GRANULOCYTES NFR BLD AUTO: 0.2 %
LDLC SERPL CALC-MCNC: 96 MG/DL
LDLC SERPL DIRECT ASSAY-MCNC: 105 MG/DL
LYMPHOCYTES # BLD AUTO: 1.1 K/UL
LYMPHOCYTES NFR BLD AUTO: 25.5 %
MAN DIFF?: NORMAL
MCHC RBC-ENTMCNC: 29 PG
MCHC RBC-ENTMCNC: 31 GM/DL
MCV RBC AUTO: 93.5 FL
MONOCYTES # BLD AUTO: 0.42 K/UL
MONOCYTES NFR BLD AUTO: 9.7 %
NEUTROPHILS # BLD AUTO: 2.64 K/UL
NEUTROPHILS NFR BLD AUTO: 61.3 %
PLATELET # BLD AUTO: 202 K/UL
POTASSIUM SERPL-SCNC: 4.7 MMOL/L
PROT SERPL-MCNC: 6.6 G/DL
RBC # BLD: 4.96 M/UL
RBC # FLD: 13.7 %
SODIUM SERPL-SCNC: 143 MMOL/L
TRIGL SERPL-MCNC: 74 MG/DL
WBC # FLD AUTO: 4.31 K/UL

## 2020-01-21 ENCOUNTER — APPOINTMENT (OUTPATIENT)
Dept: INTERNAL MEDICINE | Facility: CLINIC | Age: 80
End: 2020-01-21
Payer: MEDICARE

## 2020-01-21 VITALS
BODY MASS INDEX: 27.92 KG/M2 | WEIGHT: 173 LBS | SYSTOLIC BLOOD PRESSURE: 124 MMHG | TEMPERATURE: 97.5 F | DIASTOLIC BLOOD PRESSURE: 80 MMHG

## 2020-01-21 DIAGNOSIS — H93.90 UNSPECIFIED DISORDER OF EAR, UNSPECIFIED EAR: ICD-10-CM

## 2020-01-21 DIAGNOSIS — Z01.818 ENCOUNTER FOR OTHER PREPROCEDURAL EXAMINATION: ICD-10-CM

## 2020-01-21 PROCEDURE — 99214 OFFICE O/P EST MOD 30 MIN: CPT | Mod: 25

## 2020-01-21 PROCEDURE — 36415 COLL VENOUS BLD VENIPUNCTURE: CPT

## 2020-01-25 LAB
ALBUMIN SERPL ELPH-MCNC: 4.1 G/DL
ALP BLD-CCNC: 53 U/L
ALT SERPL-CCNC: 15 U/L
ANION GAP SERPL CALC-SCNC: 13 MMOL/L
APTT BLD: 29.9 SEC
AST SERPL-CCNC: 23 U/L
BASOPHILS # BLD AUTO: 0.05 K/UL
BASOPHILS NFR BLD AUTO: 0.9 %
BILIRUB SERPL-MCNC: 0.5 MG/DL
BUN SERPL-MCNC: 19 MG/DL
CALCIUM SERPL-MCNC: 9.2 MG/DL
CHLORIDE SERPL-SCNC: 107 MMOL/L
CO2 SERPL-SCNC: 24 MMOL/L
CREAT SERPL-MCNC: 0.78 MG/DL
EOSINOPHIL # BLD AUTO: 0.13 K/UL
EOSINOPHIL NFR BLD AUTO: 2.4 %
GLUCOSE SERPL-MCNC: 93 MG/DL
HCT VFR BLD CALC: 44.1 %
HGB BLD-MCNC: 14.2 G/DL
IMM GRANULOCYTES NFR BLD AUTO: 0.4 %
INR PPP: 0.97 RATIO
LYMPHOCYTES # BLD AUTO: 1.36 K/UL
LYMPHOCYTES NFR BLD AUTO: 24.7 %
MAN DIFF?: NORMAL
MCHC RBC-ENTMCNC: 29.5 PG
MCHC RBC-ENTMCNC: 32.2 GM/DL
MCV RBC AUTO: 91.7 FL
MONOCYTES # BLD AUTO: 0.47 K/UL
MONOCYTES NFR BLD AUTO: 8.5 %
NEUTROPHILS # BLD AUTO: 3.47 K/UL
NEUTROPHILS NFR BLD AUTO: 63.1 %
PLATELET # BLD AUTO: 212 K/UL
POTASSIUM SERPL-SCNC: 4.6 MMOL/L
PROT SERPL-MCNC: 6.8 G/DL
PT BLD: 11 SEC
RBC # BLD: 4.81 M/UL
RBC # FLD: 13.3 %
SAVE SPECIMEN: NORMAL
SODIUM SERPL-SCNC: 143 MMOL/L
WBC # FLD AUTO: 5.5 K/UL

## 2020-02-12 ENCOUNTER — APPOINTMENT (OUTPATIENT)
Dept: ENDOCRINOLOGY | Facility: CLINIC | Age: 80
End: 2020-02-12
Payer: MEDICARE

## 2020-02-12 VITALS
HEART RATE: 74 BPM | WEIGHT: 168 LBS | BODY MASS INDEX: 27.65 KG/M2 | OXYGEN SATURATION: 98 % | HEIGHT: 65.5 IN | DIASTOLIC BLOOD PRESSURE: 72 MMHG | SYSTOLIC BLOOD PRESSURE: 120 MMHG

## 2020-02-12 PROCEDURE — 99214 OFFICE O/P EST MOD 30 MIN: CPT | Mod: 25

## 2020-02-12 PROCEDURE — 77080 DXA BONE DENSITY AXIAL: CPT | Mod: GA

## 2020-02-12 PROCEDURE — ZZZZZ: CPT

## 2020-02-12 PROCEDURE — 96372 THER/PROPH/DIAG INJ SC/IM: CPT

## 2020-02-12 RX ORDER — DENOSUMAB 60 MG/ML
60 INJECTION SUBCUTANEOUS
Qty: 1 | Refills: 0 | Status: COMPLETED | OUTPATIENT
Start: 2020-02-12

## 2020-02-12 RX ADMIN — DENOSUMAB 60 MG/ML: 60 INJECTION SUBCUTANEOUS at 00:00

## 2020-02-13 NOTE — PHYSICAL EXAM
[Alert] : alert [No Acute Distress] : no acute distress [Well Nourished] : well nourished [Well Developed] : well developed [Normal Sclera/Conjunctiva] : normal sclera/conjunctiva [No Proptosis] : no proptosis [Normal Oropharynx] : the oropharynx was normal [Thyroid Not Enlarged] : the thyroid was not enlarged [No Thyroid Nodules] : there were no palpable thyroid nodules [No Respiratory Distress] : no respiratory distress [No Accessory Muscle Use] : no accessory muscle use [Clear to Auscultation] : lungs were clear to auscultation bilaterally [Normal Rate] : heart rate was normal  [Normal S1, S2] : normal S1 and S2 [Regular Rhythm] : with a regular rhythm [Normal Bowel Sounds] : normal bowel sounds [Not Tender] : non-tender [Soft] : abdomen soft [Not Distended] : not distended [Post Cervical Nodes] : posterior cervical nodes [Anterior Cervical Nodes] : anterior cervical nodes [No Spinal Tenderness] : no spinal tenderness [Axillary Nodes] : axillary nodes [Spine Straight] : spine straight [Normal Gait] : normal gait [No Stigmata of Cushings Syndrome] : no stigmata of cushings syndrome [Normal Strength/Tone] : muscle strength and tone were normal [No Rash] : no rash [Normal Reflexes] : deep tendon reflexes were 2+ and symmetric [No Tremors] : no tremors [Oriented x3] : oriented to person, place, and time [de-identified] : right ear bandaged [de-identified] : arthritic changes to fingers

## 2020-02-13 NOTE — REVIEW OF SYSTEMS
[Headache] : headaches [Joint Pain] : joint pain [Negative] : Heme/Lymph [Recent Weight Gain (___ Lbs)] : no recent weight gain [FreeTextEntry5] : HTN

## 2020-02-13 NOTE — HISTORY OF PRESENT ILLNESS
[Risedronate (Actonel)] : Risedronate [Vitamin D (supplements)] : Vitamin D as a dietary supplement [Patient taking Meds Correctly] : Patient is taking meds correctly [Prolia (Denosumab)] : Prolia (Denosumab) [FreeTextEntry1] : f/u 78 y/o female with osteoporosis\par \par Prior Actonel use for many years.On Prolia since Nov 2011. Tolerating well. No thigh pain, no interval fx. Last DDS within past 3 months. No ONJ. Not exercising. BMD 12/2015 improved. BMD 2016 and 2018 stable. Jan 2018 R wrist fx after fall on broken concrete, in cast until she had a thumb tendon rupture and surgery April 3rd 2018, followed by PT.\par \par Pt reports broken hand 12/2019, fall from standing height.\par \par Pt had recent ear surgery to make canal bigger due to scar tissue.\par \par Pt had prior cataract surgery.

## 2020-02-13 NOTE — ASSESSMENT
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 \par \par Previously treated with Actonel. Tolerating Prolia since Nov 2011. Tolerating well. No thigh pain, no interval fx. No ONJ. Bone mineral density stable, 2016 and 2018 markedly improved vs 2011. Jan 2018 R wrist fx after fall, she was in cast until she had a thumb tendon rupture and surgery April 3rd 2018, followed by PT. BMD 2/2020 indicated stable osteopenia in total hip and improving osteopenia in femoral neck and proximal radius. Ca 9.2, normal. Continue Prolia, buy and bill. \par Hyperlipidemia: controlled on statin\par f/u in 6 months

## 2020-02-13 NOTE — END OF VISIT
[FreeTextEntry3] : I, Clayton Lees, authored this note working as a medical scribe for Dr. Walls.  02/12/2020. 11:00AM. This note was authored by the medical scribe for me. I have reviewed, edited, and revised the note as needed. I am in agreement with the exam findings, imaging findings, and treatment plan.  Brian Walls MD

## 2020-02-13 NOTE — PROCEDURE
[FreeTextEntry1] : Bone mineral density: 02/12/2020 \par Indication: vs. 2018\par Spine: not performed\par Total hip: -2.1 osteopenia, no significant change\par Femoral neck: -2.1 osteopenia, +8.6%\par Proximal radius: -1.5 osteopenia, +3.5%\par \par Bone mineral density 1/17/18\par indication: compare to 2015 \par spine n/a \par total hip -2.3 osteopenia, no significant change , +11.6% vs 2011\par femoral neck -2.6 osteoporosis, no significant change vs 2015, +10.0% vs 2011\par proximal radius -1.9 osteopenia, no significant change +4.7% vs 2011\par \par bone mineral density test performed 12/11/15\par Spine -0.9, normal,  +3.5% vs 2013\par Total hip -2.3, osteopenia, +5.6% versus 2013\par femoral neck -2.4, osteopenia, 6.8% versus 2013\par Proximal radius -1.9 , osteopenia, No significant change versus 2013

## 2020-03-01 ENCOUNTER — RX RENEWAL (OUTPATIENT)
Age: 80
End: 2020-03-01

## 2020-03-16 DIAGNOSIS — R35.1 NOCTURIA: ICD-10-CM

## 2020-08-13 ENCOUNTER — APPOINTMENT (OUTPATIENT)
Dept: ENDOCRINOLOGY | Facility: CLINIC | Age: 80
End: 2020-08-13
Payer: MEDICARE

## 2020-08-13 PROCEDURE — 96372 THER/PROPH/DIAG INJ SC/IM: CPT

## 2020-08-13 RX ORDER — DENOSUMAB 60 MG/ML
60 INJECTION SUBCUTANEOUS
Qty: 1 | Refills: 0 | Status: COMPLETED | OUTPATIENT
Start: 2020-08-03

## 2020-09-02 LAB
ALBUMIN SERPL ELPH-MCNC: 4.2 G/DL
ALP BLD-CCNC: 52 U/L
ALT SERPL-CCNC: 17 U/L
ANION GAP SERPL CALC-SCNC: 11 MMOL/L
APPEARANCE: CLEAR
AST SERPL-CCNC: 22 U/L
BACTERIA: NEGATIVE
BASOPHILS # BLD AUTO: 0.04 K/UL
BASOPHILS NFR BLD AUTO: 0.6 %
BILIRUB SERPL-MCNC: 0.4 MG/DL
BILIRUBIN URINE: NEGATIVE
BLOOD URINE: NEGATIVE
BUN SERPL-MCNC: 24 MG/DL
CALCIUM SERPL-MCNC: 9.1 MG/DL
CHLORIDE SERPL-SCNC: 106 MMOL/L
CHOLEST SERPL-MCNC: 171 MG/DL
CHOLEST/HDLC SERPL: 2.1 RATIO
CO2 SERPL-SCNC: 24 MMOL/L
COLOR: NORMAL
CREAT SERPL-MCNC: 0.85 MG/DL
EOSINOPHIL # BLD AUTO: 0.12 K/UL
EOSINOPHIL NFR BLD AUTO: 1.9 %
GLUCOSE QUALITATIVE U: NEGATIVE
GLUCOSE SERPL-MCNC: 80 MG/DL
HCT VFR BLD CALC: 43.8 %
HDLC SERPL-MCNC: 83 MG/DL
HGB BLD-MCNC: 13.8 G/DL
HYALINE CASTS: 1 /LPF
IMM GRANULOCYTES NFR BLD AUTO: 0.3 %
KETONES URINE: NEGATIVE
LDLC SERPL CALC-MCNC: 68 MG/DL
LDLC SERPL DIRECT ASSAY-MCNC: 72 MG/DL
LEUKOCYTE ESTERASE URINE: NEGATIVE
LYMPHOCYTES # BLD AUTO: 1.77 K/UL
LYMPHOCYTES NFR BLD AUTO: 28.7 %
MAN DIFF?: NORMAL
MCHC RBC-ENTMCNC: 28.6 PG
MCHC RBC-ENTMCNC: 31.5 GM/DL
MCV RBC AUTO: 90.7 FL
MICROSCOPIC-UA: NORMAL
MONOCYTES # BLD AUTO: 0.67 K/UL
MONOCYTES NFR BLD AUTO: 10.9 %
NEUTROPHILS # BLD AUTO: 3.55 K/UL
NEUTROPHILS NFR BLD AUTO: 57.6 %
NITRITE URINE: NEGATIVE
PH URINE: 6
PLATELET # BLD AUTO: 208 K/UL
POTASSIUM SERPL-SCNC: 4.8 MMOL/L
PROT SERPL-MCNC: 6.3 G/DL
PROTEIN URINE: NEGATIVE
RBC # BLD: 4.83 M/UL
RBC # FLD: 13.6 %
RED BLOOD CELLS URINE: 2 /HPF
SAVE SPECIMEN: NORMAL
SODIUM SERPL-SCNC: 141 MMOL/L
SPECIFIC GRAVITY URINE: 1.02
SQUAMOUS EPITHELIAL CELLS: 0 /HPF
T4 FREE SERPL-MCNC: 0.8 NG/DL
TRIGL SERPL-MCNC: 95 MG/DL
TSH SERPL-ACNC: 1.72 UIU/ML
UROBILINOGEN URINE: NORMAL
WBC # FLD AUTO: 6.17 K/UL
WHITE BLOOD CELLS URINE: 0 /HPF

## 2020-11-01 ENCOUNTER — FORM ENCOUNTER (OUTPATIENT)
Age: 80
End: 2020-11-01

## 2020-12-19 DIAGNOSIS — Z00.00 ENCOUNTER FOR GENERAL ADULT MEDICAL EXAMINATION W/OUT ABNORMAL FINDINGS: ICD-10-CM

## 2020-12-21 ENCOUNTER — APPOINTMENT (OUTPATIENT)
Dept: INTERNAL MEDICINE | Facility: CLINIC | Age: 80
End: 2020-12-21
Payer: MEDICARE

## 2020-12-21 VITALS
HEART RATE: 76 BPM | HEIGHT: 65 IN | BODY MASS INDEX: 29.66 KG/M2 | SYSTOLIC BLOOD PRESSURE: 120 MMHG | OXYGEN SATURATION: 99 % | TEMPERATURE: 96.6 F | WEIGHT: 178 LBS | DIASTOLIC BLOOD PRESSURE: 78 MMHG | RESPIRATION RATE: 17 BRPM

## 2020-12-21 VITALS — SYSTOLIC BLOOD PRESSURE: 130 MMHG | DIASTOLIC BLOOD PRESSURE: 80 MMHG

## 2020-12-21 DIAGNOSIS — F43.21 ADJUSTMENT DISORDER WITH DEPRESSED MOOD: ICD-10-CM

## 2020-12-21 PROCEDURE — 99214 OFFICE O/P EST MOD 30 MIN: CPT | Mod: 25

## 2020-12-21 PROCEDURE — G0439: CPT

## 2020-12-21 PROCEDURE — 93000 ELECTROCARDIOGRAM COMPLETE: CPT | Mod: 59

## 2020-12-21 NOTE — ASSESSMENT
[FreeTextEntry1] : \par ecg---SR; WNL\par \par imp---COPD on chest CT; 30 pack yrs. cigs; mild METZGER\par          dilation TA---4.2 cm. on CT chest 2019\par          pulmonary nodules---CT as above; ? enlargement c/w 2016; risk +\par          mild depression---declining intervention\par  \par plan--FBW (Sept. 2020) reviewed\par          CT chest n.c.\par          Pneumovax administered\par          continue current meds\par          OV 6 mos---recheck TFTs (borderline low FT4; normal TSH)\par  \par \par \par

## 2020-12-21 NOTE — HISTORY OF PRESENT ILLNESS
[FreeTextEntry1] : \par \par here for CPE [de-identified] : \par \par generally;\par gaining weight; \par \par exceptionally tired ....mentally and physically\par \par left precordial pressure when lying on left side or lying flat; asx. sitting up;\par no chest discomfort with physical activity, just SOB (2 flights)\par \par

## 2020-12-21 NOTE — HEALTH RISK ASSESSMENT
[Good] : ~his/her~  mood as  good [] : Yes [30 or more] : 30 or more [Yes] : Yes [1 or 2 (0 pts)] : 1 or 2 (0 points) [Never (0 pts)] : Never (0 points) [No] : In the past 12 months have you used drugs other than those required for medical reasons? No [No falls in past year] : Patient reported no falls in the past year [1] : 2) Feeling down, depressed, or hopeless for several days (1) [Patient reported mammogram was normal] : Patient reported mammogram was normal [Patient reported bone density results were abnormal] : Patient reported bone density results were abnormal [None] : None [Alone] : lives alone [Retired] : retired [College] : College [] :  [# Of Children ___] : has [unfilled] children [Feels Safe at Home] : Feels safe at home [Fully functional (bathing, dressing, toileting, transferring, walking, feeding)] : Fully functional (bathing, dressing, toileting, transferring, walking, feeding) [Fully functional (using the telephone, shopping, preparing meals, housekeeping, doing laundry, using] : Fully functional and needs no help or supervision to perform IADLs (using the telephone, shopping, preparing meals, housekeeping, doing laundry, using transportation, managing medications and managing finances) [Reports changes in vision] : Reports changes in vision [Reports normal functional visual acuity (ie: able to read med bottle)] : Reports normal functional visual acuity [Smoke Detector] : smoke detector [Carbon Monoxide Detector] : carbon monoxide detector [Seat Belt] :  uses seat belt [With Patient/Caregiver] : With Patient/Caregiver [Designated Healthcare Proxy] : Designated healthcare proxy [Name: ___] : Health Care Proxy's Name: [unfilled]  [Relationship: ___] : Relationship: [unfilled] [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 [YearQuit] : 1990 [de-identified] : 1 glass wine daily [de-identified] : not exercising [de-identified] : none [FreeTextEntry1] : not suicidal; spouse  2019; isolating secondary to COVID; declining meds and /or therapy [[id number]] : 2 [Change in mental status noted] : No change in mental status noted [High Risk Behavior] : no high risk behavior [Reports changes in hearing] : Reports no changes in hearing [Reports changes in dental health] : Reports no changes in dental health [Guns at Home] : no guns at home [Sunscreen] : does not use sunscreen [Travel to Developing Areas] : does not  travel to developing areas [TB Exposure] : is not being exposed to tuberculosis [MammogramDate] : 10/20 [BoneDensityDate] : 02/20 [BoneDensityComments] : osteoporosis [ColonoscopyDate] : never [ColonoscopyComments] : Cologuard...."a few yrs. ago." [FreeTextEntry2] :  [de-identified] : improved after cataract  [AdvancecareDate] : 12/20

## 2020-12-21 NOTE — REVIEW OF SYSTEMS
[Patient Intake Form Reviewed] : Patient intake form was reviewed [Fatigue] : fatigue [Shortness Of Breath] : shortness of breath

## 2020-12-23 ENCOUNTER — NON-APPOINTMENT (OUTPATIENT)
Age: 80
End: 2020-12-23

## 2021-01-16 ENCOUNTER — APPOINTMENT (OUTPATIENT)
Dept: CT IMAGING | Facility: CLINIC | Age: 81
End: 2021-01-16
Payer: MEDICARE

## 2021-01-16 ENCOUNTER — OUTPATIENT (OUTPATIENT)
Dept: OUTPATIENT SERVICES | Facility: HOSPITAL | Age: 81
LOS: 1 days | End: 2021-01-16
Payer: MEDICARE

## 2021-01-16 ENCOUNTER — RESULT REVIEW (OUTPATIENT)
Age: 81
End: 2021-01-16

## 2021-01-16 DIAGNOSIS — R91.8 OTHER NONSPECIFIC ABNORMAL FINDING OF LUNG FIELD: ICD-10-CM

## 2021-01-16 PROCEDURE — 71250 CT THORAX DX C-: CPT | Mod: 26,ME

## 2021-01-16 PROCEDURE — 71250 CT THORAX DX C-: CPT

## 2021-01-16 PROCEDURE — G1004: CPT

## 2021-01-26 ENCOUNTER — APPOINTMENT (OUTPATIENT)
Dept: INTERNAL MEDICINE | Facility: CLINIC | Age: 81
End: 2021-01-26
Payer: MEDICARE

## 2021-01-26 VITALS
DIASTOLIC BLOOD PRESSURE: 80 MMHG | RESPIRATION RATE: 16 BRPM | TEMPERATURE: 98.8 F | WEIGHT: 178 LBS | BODY MASS INDEX: 29.66 KG/M2 | OXYGEN SATURATION: 98 % | SYSTOLIC BLOOD PRESSURE: 128 MMHG | HEIGHT: 65 IN | HEART RATE: 76 BPM

## 2021-01-26 PROCEDURE — 99204 OFFICE O/P NEW MOD 45 MIN: CPT

## 2021-01-26 NOTE — REASON FOR VISIT
[Initial] : an initial visit [COPD] : COPD [Emphysema] : emphysema [Pulmonary Nodules] : pulmonary nodules [TextBox_44] : The principal reason for the visit is because of enlarging nodule was in the lingula and left upper lobe [TextBox_13] : Dr. Vlad Mead

## 2021-01-26 NOTE — HISTORY OF PRESENT ILLNESS
[TextBox_4] : The patient has been followed for many years because of chronic obstructive pulmonary disease and the presence of pulmonary nodules. She is a former cigarette smoker. She has a chronic cough with occasional green sputum, but no hemoptysis.\par CT scans have been done annually on a regular basis since 2015.  There is a 0.9 cm noncalcified nodule in the lingula which has been slowly enlarging.  There has also been a 1 cm opacity in the left upper lobe which has been slowly increasing in size.   Also present in the lung has been apical scarring,  bibasilar atelectasis and centrolobular emphysema in the left upper lobe ..\par \par She has a significant cigarette smoking history, having smoked for 30 years, but discontinued cigarette smoking in 1990.

## 2021-01-26 NOTE — ASSESSMENT
[FreeTextEntry1] : Management options with reference to the 2 nodules are discussed in detail. Continued observation does not appear to be attractive in view of the fact that the lesions have been slowly increasing in size, albeit slowly. A visually-assisted thoracoscopy can be considered, but the fact that there are 2 nodules, one in each lung as well as the presence of emphysematous changes in the area of the left upper lobe increase the risk of complication. \par \par I have recommended a PET scan to see if one or both of these lesions are avid.Complicating this course of action is the fact that she has a history of a reaction to iodinated contrast-intractable sneezing. I will speak to radiology to see if the PET s is a viable course of action. Formal pulmonary function tests will be scheduled at the [facility] pulmonary lab in the interim.

## 2021-01-26 NOTE — PHYSICAL EXAM
[No Acute Distress] : no acute distress [Normal Appearance] : normal appearance [Supple] : supple [No Neck Mass] : no neck mass [Normal Rate/Rhythm] : normal rate/rhythm [No Murmurs] : no murmurs [No Resp Distress] : no resp distress [No Acc Muscle Use] : no acc muscle use [Clear to Auscultation Bilaterally] : clear to auscultation bilaterally [Normal to Percussion] : normal to percussion [No Abnormalities] : no abnormalities [Normal Gait] : normal gait [No Clubbing] : no clubbing [No Edema] : no edema [Normal Color/ Pigmentation] : normal color/ pigmentation [No Focal Deficits] : no focal deficits [Oriented x3] : oriented x3

## 2021-02-08 LAB
25(OH)D3 SERPL-MCNC: 38.4 NG/ML
ALBUMIN SERPL ELPH-MCNC: 4.3 G/DL
ALP BLD-CCNC: 61 U/L
ALT SERPL-CCNC: 18 U/L
ANION GAP SERPL CALC-SCNC: 14 MMOL/L
AST SERPL-CCNC: 22 U/L
BILIRUB SERPL-MCNC: 0.5 MG/DL
BUN SERPL-MCNC: 22 MG/DL
CALCIUM SERPL-MCNC: 9.5 MG/DL
CHLORIDE SERPL-SCNC: 102 MMOL/L
CO2 SERPL-SCNC: 23 MMOL/L
CREAT SERPL-MCNC: 0.91 MG/DL
GLUCOSE SERPL-MCNC: 95 MG/DL
POTASSIUM SERPL-SCNC: 4.4 MMOL/L
PROT SERPL-MCNC: 6.8 G/DL
SODIUM SERPL-SCNC: 139 MMOL/L

## 2021-02-18 ENCOUNTER — RX RENEWAL (OUTPATIENT)
Age: 81
End: 2021-02-18

## 2021-02-25 ENCOUNTER — APPOINTMENT (OUTPATIENT)
Dept: ENDOCRINOLOGY | Facility: CLINIC | Age: 81
End: 2021-02-25
Payer: MEDICARE

## 2021-02-25 VITALS
HEIGHT: 65 IN | OXYGEN SATURATION: 98 % | DIASTOLIC BLOOD PRESSURE: 82 MMHG | WEIGHT: 173 LBS | HEART RATE: 76 BPM | TEMPERATURE: 97.5 F | SYSTOLIC BLOOD PRESSURE: 126 MMHG | BODY MASS INDEX: 28.82 KG/M2

## 2021-02-25 PROCEDURE — 99214 OFFICE O/P EST MOD 30 MIN: CPT | Mod: 25

## 2021-02-25 PROCEDURE — 96372 THER/PROPH/DIAG INJ SC/IM: CPT

## 2021-02-25 RX ORDER — DENOSUMAB 60 MG/ML
60 INJECTION SUBCUTANEOUS
Qty: 1 | Refills: 0 | Status: COMPLETED | OUTPATIENT
Start: 2021-02-25

## 2021-02-25 RX ADMIN — DENOSUMAB 0 MG/ML: 60 INJECTION SUBCUTANEOUS at 00:00

## 2021-02-25 NOTE — PHYSICAL EXAM
[Alert] : alert [Well Nourished] : well nourished [No Acute Distress] : no acute distress [Well Developed] : well developed [Normal Sclera/Conjunctiva] : normal sclera/conjunctiva [EOMI] : extra ocular movement intact [No Proptosis] : no proptosis [Thyroid Not Enlarged] : the thyroid was not enlarged [No Thyroid Nodules] : no palpable thyroid nodules [Clear to Auscultation] : lungs were clear to auscultation bilaterally [Normal S1, S2] : normal S1 and S2 [Normal Rate] : heart rate was normal [Regular Rhythm] : with a regular rhythm [No Edema] : no peripheral edema [Normal Bowel Sounds] : normal bowel sounds [Not Tender] : non-tender [Not Distended] : not distended [Soft] : abdomen soft [Normal Anterior Cervical Nodes] : no anterior cervical lymphadenopathy [No Spinal Tenderness] : no spinal tenderness [Spine Straight] : spine straight [No Stigmata of Cushings Syndrome] : no stigmata of Cushings Syndrome [Normal Gait] : normal gait [Normal Reflexes] : deep tendon reflexes were 2+ and symmetric [No Tremors] : no tremors [Oriented x3] : oriented to person, place, and time

## 2021-02-26 ENCOUNTER — APPOINTMENT (OUTPATIENT)
Dept: ENDOCRINOLOGY | Facility: CLINIC | Age: 81
End: 2021-02-26

## 2021-02-26 NOTE — END OF VISIT
[FreeTextEntry3] : I, Clayton Lees, authored this note working as a medical scribe for Dr. Walls.  02/25/2021. 12:45PM. This note was authored by the medical scribe for me. I have reviewed, edited, and revised the note as needed. I am in agreement with the exam findings, imaging findings, and treatment plan.  Brian Walls MD

## 2021-02-26 NOTE — HISTORY OF PRESENT ILLNESS
[Risedronate (Actonel)] : Risedronate [Vitamin D (supplements)] : Vitamin D as a dietary supplement [Prolia (Denosumab)] : Prolia (Denosumab) [FreeTextEntry1] : No significant health change. No interval surgery's, fractures, health change, or change in medications.\par \par Prior Actonel use for many years.On Prolia since Nov 2011. Tolerating well. No thigh pain, no interval fx. Last DDS within past 6 months. No ONJ. BMD 12/2015 improved. BMD 2016 and 2018 stable. Jan 2018 R wrist fx after fall on broken concrete, in cast until she had a thumb tendon rupture and surgery April 3rd 2018, followed by PT. Pt reports broken hand 12/2019, fall from standing height. BMD 2/2020 indicated stable osteopenia in total hip and improving osteopenia in femoral neck and proximal radius.\par \par Pt had ear surgery to make canal bigger due to scar tissue.\par \par Pt had prior cataract surgery.\par \par Pt will have 2021 PET scan for lung nodules.

## 2021-02-26 NOTE — ASSESSMENT
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 \par \par Previously treated with Actonel. Tolerating Prolia since Nov 2011. Tolerating well. No thigh pain, no interval fx. No ONJ. Bone mineral density stable, 2016 and 2018 markedly improved vs 2011. BMD 2/2020 indicated stable osteopenia in total hip and improving osteopenia in femoral neck and proximal radius. Continue Prolia, buy and bill.\par \par Hyperlipidemia: controlled on statin.\par \par Labs reviewed: Ca 9.5, normal. Vitamin D 38.4, normal.\par \par F/u in 6 months

## 2021-03-08 ENCOUNTER — APPOINTMENT (OUTPATIENT)
Dept: DISASTER EMERGENCY | Facility: CLINIC | Age: 81
End: 2021-03-08

## 2021-03-09 LAB — SARS-COV-2 N GENE NPH QL NAA+PROBE: NOT DETECTED

## 2021-03-12 ENCOUNTER — APPOINTMENT (OUTPATIENT)
Dept: PULMONOLOGY | Facility: CLINIC | Age: 81
End: 2021-03-12
Payer: MEDICARE

## 2021-03-12 PROCEDURE — 94618 PULMONARY STRESS TESTING: CPT

## 2021-03-12 PROCEDURE — 94726 PLETHYSMOGRAPHY LUNG VOLUMES: CPT

## 2021-03-12 PROCEDURE — 94010 BREATHING CAPACITY TEST: CPT

## 2021-03-12 PROCEDURE — 94729 DIFFUSING CAPACITY: CPT

## 2021-03-12 PROCEDURE — ZZZZZ: CPT

## 2021-04-13 ENCOUNTER — APPOINTMENT (OUTPATIENT)
Dept: NUCLEAR MEDICINE | Facility: IMAGING CENTER | Age: 81
End: 2021-04-13
Payer: MEDICARE

## 2021-04-13 ENCOUNTER — OUTPATIENT (OUTPATIENT)
Dept: OUTPATIENT SERVICES | Facility: HOSPITAL | Age: 81
LOS: 1 days | End: 2021-04-13
Payer: MEDICARE

## 2021-04-13 ENCOUNTER — RESULT REVIEW (OUTPATIENT)
Age: 81
End: 2021-04-13

## 2021-04-13 DIAGNOSIS — R91.8 OTHER NONSPECIFIC ABNORMAL FINDING OF LUNG FIELD: ICD-10-CM

## 2021-04-13 PROCEDURE — G1004: CPT

## 2021-04-13 PROCEDURE — 78815 PET IMAGE W/CT SKULL-THIGH: CPT | Mod: 26,PI,MG

## 2021-04-13 PROCEDURE — A9552: CPT

## 2021-04-13 PROCEDURE — 78815 PET IMAGE W/CT SKULL-THIGH: CPT

## 2021-04-29 ENCOUNTER — NON-APPOINTMENT (OUTPATIENT)
Age: 81
End: 2021-04-29

## 2021-06-21 ENCOUNTER — APPOINTMENT (OUTPATIENT)
Dept: INTERNAL MEDICINE | Facility: CLINIC | Age: 81
End: 2021-06-21
Payer: MEDICARE

## 2021-06-21 VITALS
HEIGHT: 65 IN | SYSTOLIC BLOOD PRESSURE: 144 MMHG | RESPIRATION RATE: 17 BRPM | OXYGEN SATURATION: 97 % | BODY MASS INDEX: 29.49 KG/M2 | WEIGHT: 177 LBS | DIASTOLIC BLOOD PRESSURE: 80 MMHG | HEART RATE: 73 BPM

## 2021-06-21 VITALS — SYSTOLIC BLOOD PRESSURE: 120 MMHG | DIASTOLIC BLOOD PRESSURE: 80 MMHG

## 2021-06-21 DIAGNOSIS — F32.0 MAJOR DEPRESSIVE DISORDER, SINGLE EPISODE, MILD: ICD-10-CM

## 2021-06-21 PROCEDURE — 99214 OFFICE O/P EST MOD 30 MIN: CPT | Mod: 25

## 2021-06-21 PROCEDURE — 93000 ELECTROCARDIOGRAM COMPLETE: CPT

## 2021-06-23 ENCOUNTER — NON-APPOINTMENT (OUTPATIENT)
Age: 81
End: 2021-06-23

## 2021-06-23 LAB
ALBUMIN SERPL ELPH-MCNC: 4.3 G/DL
ALP BLD-CCNC: 59 U/L
ALT SERPL-CCNC: 13 U/L
ANION GAP SERPL CALC-SCNC: 10 MMOL/L
AST SERPL-CCNC: 23 U/L
BASOPHILS # BLD AUTO: 0.07 K/UL
BASOPHILS NFR BLD AUTO: 1 %
BILIRUB SERPL-MCNC: 0.6 MG/DL
BUN SERPL-MCNC: 18 MG/DL
CALCIUM SERPL-MCNC: 9.6 MG/DL
CHLORIDE SERPL-SCNC: 104 MMOL/L
CO2 SERPL-SCNC: 25 MMOL/L
CREAT SERPL-MCNC: 0.81 MG/DL
EOSINOPHIL # BLD AUTO: 0.12 K/UL
EOSINOPHIL NFR BLD AUTO: 1.7 %
GLUCOSE SERPL-MCNC: 84 MG/DL
HCT VFR BLD CALC: 46.4 %
HGB BLD-MCNC: 15 G/DL
IMM GRANULOCYTES NFR BLD AUTO: 0.4 %
LYMPHOCYTES # BLD AUTO: 2.16 K/UL
LYMPHOCYTES NFR BLD AUTO: 31.3 %
MAN DIFF?: NORMAL
MCHC RBC-ENTMCNC: 29.5 PG
MCHC RBC-ENTMCNC: 32.3 GM/DL
MCV RBC AUTO: 91.3 FL
MONOCYTES # BLD AUTO: 0.64 K/UL
MONOCYTES NFR BLD AUTO: 9.3 %
NEUTROPHILS # BLD AUTO: 3.87 K/UL
NEUTROPHILS NFR BLD AUTO: 56.3 %
PLATELET # BLD AUTO: 215 K/UL
POTASSIUM SERPL-SCNC: 4.1 MMOL/L
PROT SERPL-MCNC: 7.2 G/DL
RBC # BLD: 5.08 M/UL
RBC # FLD: 14.3 %
SODIUM SERPL-SCNC: 139 MMOL/L
T4 FREE SERPL-MCNC: 0.9 NG/DL
TSH SERPL-ACNC: 2.13 UIU/ML
WBC # FLD AUTO: 6.89 K/UL

## 2021-06-23 NOTE — ASSESSMENT
[FreeTextEntry1] : \par \par ecg done in office for nonobstructive CAD; SR; WNL\par \par imp--nonocclusive CAD-----per CTA coronaries, 12/29/16\par         pulmonary nodules---see pulmonary note; deciding on ? bx vs. continued observation\par         dilation TA---mid-ascending dimension -4.2 cm on CT chest in March; minimally changed\par         from dimension noted on CTA coronaries in 2016\par         mild depression---noted previously (passing of spouse); seems better\par \par plan---repeat FT4 and TSH; borderline FT4 last OV\par            continue other meds\par            pulmonary f/u as above\par            CPE in Dec.

## 2021-06-23 NOTE — REASON FOR VISIT
[CV Risk Factors and Non-Cardiac Disease] : CV risk factors and non-cardiac disease [Hyperlipidemia] : hyperlipidemia [FreeTextEntry1] : \par generally well;\par no chest pain\par \par deciding re: ? bx. for suspicious pulmonary nodule\par \par \par

## 2021-08-26 ENCOUNTER — APPOINTMENT (OUTPATIENT)
Dept: ENDOCRINOLOGY | Facility: CLINIC | Age: 81
End: 2021-08-26

## 2021-08-26 ENCOUNTER — APPOINTMENT (OUTPATIENT)
Dept: ENDOCRINOLOGY | Facility: CLINIC | Age: 81
End: 2021-08-26
Payer: MEDICARE

## 2021-08-26 PROCEDURE — 96372 THER/PROPH/DIAG INJ SC/IM: CPT

## 2021-08-26 RX ADMIN — DENOSUMAB 60 MG/ML: 60 INJECTION SUBCUTANEOUS at 00:00

## 2021-08-28 RX ORDER — DENOSUMAB 60 MG/ML
60 INJECTION SUBCUTANEOUS
Qty: 1 | Refills: 0 | Status: COMPLETED | OUTPATIENT
Start: 2021-08-26

## 2021-08-31 ENCOUNTER — FORM ENCOUNTER (OUTPATIENT)
Age: 81
End: 2021-08-31

## 2021-10-08 ENCOUNTER — APPOINTMENT (OUTPATIENT)
Dept: OBGYN | Facility: CLINIC | Age: 81
End: 2021-10-08

## 2021-10-19 ENCOUNTER — NON-APPOINTMENT (OUTPATIENT)
Age: 81
End: 2021-10-19

## 2021-10-19 LAB — CYTOLOGY CVX/VAG DOC THIN PREP: NORMAL

## 2021-11-24 ENCOUNTER — NON-APPOINTMENT (OUTPATIENT)
Age: 81
End: 2021-11-24

## 2021-12-14 LAB
ALBUMIN SERPL ELPH-MCNC: 4.2 G/DL
ALP BLD-CCNC: 59 U/L
ALT SERPL-CCNC: 18 U/L
ANION GAP SERPL CALC-SCNC: 13 MMOL/L
APPEARANCE: CLEAR
AST SERPL-CCNC: 23 U/L
BACTERIA: NEGATIVE
BASOPHILS # BLD AUTO: 0.06 K/UL
BASOPHILS NFR BLD AUTO: 1 %
BILIRUB SERPL-MCNC: 0.4 MG/DL
BILIRUBIN URINE: NEGATIVE
BLOOD URINE: NEGATIVE
BUN SERPL-MCNC: 17 MG/DL
CALCIUM SERPL-MCNC: 8.8 MG/DL
CHLORIDE SERPL-SCNC: 109 MMOL/L
CHOLEST SERPL-MCNC: 180 MG/DL
CO2 SERPL-SCNC: 22 MMOL/L
COLOR: NORMAL
CREAT SERPL-MCNC: 0.79 MG/DL
EOSINOPHIL # BLD AUTO: 0.18 K/UL
EOSINOPHIL NFR BLD AUTO: 3 %
GLUCOSE QUALITATIVE U: NEGATIVE
GLUCOSE SERPL-MCNC: 96 MG/DL
HCT VFR BLD CALC: 46.9 %
HDLC SERPL-MCNC: 90 MG/DL
HGB BLD-MCNC: 14.9 G/DL
HYALINE CASTS: 0 /LPF
IMM GRANULOCYTES NFR BLD AUTO: 0.5 %
KETONES URINE: NEGATIVE
LDLC SERPL CALC-MCNC: 74 MG/DL
LDLC SERPL DIRECT ASSAY-MCNC: 77 MG/DL
LEUKOCYTE ESTERASE URINE: NEGATIVE
LYMPHOCYTES # BLD AUTO: 1.93 K/UL
LYMPHOCYTES NFR BLD AUTO: 32.1 %
MAN DIFF?: NORMAL
MCHC RBC-ENTMCNC: 28.9 PG
MCHC RBC-ENTMCNC: 31.8 GM/DL
MCV RBC AUTO: 90.9 FL
MICROSCOPIC-UA: NORMAL
MONOCYTES # BLD AUTO: 0.5 K/UL
MONOCYTES NFR BLD AUTO: 8.3 %
NEUTROPHILS # BLD AUTO: 3.31 K/UL
NEUTROPHILS NFR BLD AUTO: 55.1 %
NITRITE URINE: NEGATIVE
NONHDLC SERPL-MCNC: 90 MG/DL
PH URINE: 5.5
PLATELET # BLD AUTO: 274 K/UL
POTASSIUM SERPL-SCNC: 4.6 MMOL/L
PROT SERPL-MCNC: 6.6 G/DL
PROTEIN URINE: NORMAL
RBC # BLD: 5.16 M/UL
RBC # FLD: 13.5 %
RED BLOOD CELLS URINE: 1 /HPF
SODIUM SERPL-SCNC: 143 MMOL/L
SPECIFIC GRAVITY URINE: 1.03
SQUAMOUS EPITHELIAL CELLS: 1 /HPF
T4 FREE SERPL-MCNC: 0.9 NG/DL
TRIGL SERPL-MCNC: 83 MG/DL
TSH SERPL-ACNC: 2.1 UIU/ML
UROBILINOGEN URINE: NORMAL
WBC # FLD AUTO: 6.01 K/UL
WHITE BLOOD CELLS URINE: 3 /HPF

## 2021-12-22 ENCOUNTER — APPOINTMENT (OUTPATIENT)
Dept: INTERNAL MEDICINE | Facility: CLINIC | Age: 81
End: 2021-12-22
Payer: MEDICARE

## 2021-12-22 VITALS
HEIGHT: 65 IN | BODY MASS INDEX: 28.82 KG/M2 | SYSTOLIC BLOOD PRESSURE: 118 MMHG | RESPIRATION RATE: 17 BRPM | DIASTOLIC BLOOD PRESSURE: 78 MMHG | HEART RATE: 73 BPM | OXYGEN SATURATION: 98 % | WEIGHT: 173 LBS

## 2021-12-22 VITALS — DIASTOLIC BLOOD PRESSURE: 80 MMHG | SYSTOLIC BLOOD PRESSURE: 130 MMHG

## 2021-12-22 DIAGNOSIS — I25.10 ATHEROSCLEROTIC HEART DISEASE OF NATIVE CORONARY ARTERY W/OUT ANGINA PECTORIS: ICD-10-CM

## 2021-12-22 PROCEDURE — 99214 OFFICE O/P EST MOD 30 MIN: CPT | Mod: 25

## 2021-12-22 PROCEDURE — 93000 ELECTROCARDIOGRAM COMPLETE: CPT

## 2021-12-22 PROCEDURE — G0439: CPT

## 2021-12-22 NOTE — HISTORY OF PRESENT ILLNESS
[FreeTextEntry1] : \par \par here for CPE [de-identified] : \par [facility] screening last week:  carotid artery plaque; PAD, osteoporosis risk; AAA screening ; afib. screening; results pending.\par \par found Cologuard report from 2017.....negative\par \par pulmonary nodules---seen by Dr. YANIRA Mead; ? VATS vs. watchful waiting; pt. decided on the latter; will have PET/CT in April of 2022\par needs f/u CT chest

## 2021-12-22 NOTE — HEALTH RISK ASSESSMENT
[Good] : ~his/her~  mood as  good [Intercurrent ED visits] : went to ED [Yes] : Yes [4 or more  times a week (4 pts)] : 4 or more  times a week (4 points) [No] : In the past 12 months have you used drugs other than those required for medical reasons? No [No falls in past year] : Patient reported no falls in the past year [0] : 2) Feeling down, depressed, or hopeless: Not at all (0) [Patient reported colonoscopy was normal] : Patient reported colonoscopy was normal [Alone] : lives alone [Retired] : retired [High School] : high school [# Of Children ___] : has [unfilled] children [Feels Safe at Home] : Feels safe at home [Fully functional (bathing, dressing, toileting, transferring, walking, feeding)] : Fully functional (bathing, dressing, toileting, transferring, walking, feeding) [Fully functional (using the telephone, shopping, preparing meals, housekeeping, doing laundry, using] : Fully functional and needs no help or supervision to perform IADLs (using the telephone, shopping, preparing meals, housekeeping, doing laundry, using transportation, managing medications and managing finances) [Reports normal functional visual acuity (ie: able to read med bottle)] : Reports normal functional visual acuity [Smoke Detector] : smoke detector [Carbon Monoxide Detector] : carbon monoxide detector [Seat Belt] :  uses seat belt [Sunscreen] : uses sunscreen [Designated Healthcare Proxy] : Designated healthcare proxy [Name: ___] : Health Care Proxy's Name: [unfilled]  [Relationship: ___] : Relationship: [unfilled] [de-identified] : pulmonary [YearQuit] : 1980 [de-identified] : 2 drinks per day; usually wine; occ. vodka/tonic [de-identified] : treadmill  daily; minimum 2 miles [de-identified] : ad cliff [[id number]] : 0 [Change in mental status noted] : No change in mental status noted [High Risk Behavior] : no high risk behavior [Reports changes in hearing] : Reports no changes in hearing [Reports changes in vision] : Reports no changes in vision [Reports changes in dental health] : Reports no changes in dental health [Guns at Home] : no guns at home [Travel to Developing Areas] : does not  travel to developing areas [TB Exposure] : is not being exposed to tuberculosis [MammogramDate] : 11/21 [BoneDensityDate] : 12/21 [BoneDensityComments] : sees Dr. 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

## 2021-12-22 NOTE — PHYSICAL EXAM
[No Acute Distress] : no acute distress [Well Nourished] : well nourished [Well Developed] : well developed [Well-Appearing] : well-appearing [Normal Sclera/Conjunctiva] : normal sclera/conjunctiva [PERRL] : pupils equal round and reactive to light [EOMI] : extraocular movements intact [Normal Outer Ear/Nose] : the outer ears and nose were normal in appearance [Normal Oropharynx] : the oropharynx was normal [No JVD] : no jugular venous distention [No Lymphadenopathy] : no lymphadenopathy [Supple] : supple [Thyroid Normal, No Nodules] : the thyroid was normal and there were no nodules present [No Respiratory Distress] : no respiratory distress  [No Accessory Muscle Use] : no accessory muscle use [Clear to Auscultation] : lungs were clear to auscultation bilaterally [Normal Rate] : normal rate  [Regular Rhythm] : with a regular rhythm [Normal S1, S2] : normal S1 and S2 [No Carotid Bruits] : no carotid bruits [No Abdominal Bruit] : a ~M bruit was not heard ~T in the abdomen [No Varicosities] : no varicosities [Pedal Pulses Present] : the pedal pulses are present [No Edema] : there was no peripheral edema [No Palpable Aorta] : no palpable aorta [No Extremity Clubbing/Cyanosis] : no extremity clubbing/cyanosis [Soft] : abdomen soft [Non Tender] : non-tender [Non-distended] : non-distended [No Masses] : no abdominal mass palpated [No HSM] : no HSM [Normal Bowel Sounds] : normal bowel sounds [Normal Posterior Cervical Nodes] : no posterior cervical lymphadenopathy [Normal Anterior Cervical Nodes] : no anterior cervical lymphadenopathy [No CVA Tenderness] : no CVA  tenderness [No Spinal Tenderness] : no spinal tenderness [No Joint Swelling] : no joint swelling [Grossly Normal Strength/Tone] : grossly normal strength/tone [No Rash] : no rash [Coordination Grossly Intact] : coordination grossly intact [No Focal Deficits] : no focal deficits [Normal Gait] : normal gait [Deep Tendon Reflexes (DTR)] : deep tendon reflexes were 2+ and symmetric [Normal Affect] : the affect was normal [Normal Insight/Judgement] : insight and judgment were intact [de-identified] : 1/6 SM

## 2022-02-28 ENCOUNTER — APPOINTMENT (OUTPATIENT)
Dept: OBGYN | Facility: CLINIC | Age: 82
End: 2022-02-28
Payer: MEDICARE

## 2022-02-28 VITALS
DIASTOLIC BLOOD PRESSURE: 80 MMHG | SYSTOLIC BLOOD PRESSURE: 124 MMHG | WEIGHT: 175 LBS | BODY MASS INDEX: 29.16 KG/M2 | HEIGHT: 65 IN

## 2022-02-28 DIAGNOSIS — Z12.39 ENCOUNTER FOR OTHER SCREENING FOR MALIGNANT NEOPLASM OF BREAST: ICD-10-CM

## 2022-02-28 PROCEDURE — G0101: CPT

## 2022-02-28 NOTE — PLAN
[FreeTextEntry1] : HCM\par -SBE\par -pap not indicated, no Hx of abnormal paps/no new partners\par -up to date with mammo 10/2021\par -MVI, Calcium, Vit d\par -Weight/exercise\par RTO 1 year\par

## 2022-02-28 NOTE — HISTORY OF PRESENT ILLNESS
[TextBox_4] : 80yo presents for routine gyn exam.  No complaints.  Up to date with mammo/sono as well as had Hessmer-sha a few years ago.  \par \par \par Info. from prior EMR:\par Demographics:  Race: White Ethnicity: Not  or  Native Language: English \par : 0  Para:  0 0 0 0 Menopause: Age: 49 \par OB History: No significant OB history. \par \par GYN History: No significant GYN history. Single \par PMH\par  No significant past medical history. \par Surgical History: INTESTINAL SURGERY (volvulus?), ear surgery \par Allergies: IV DYE, PCN \par  [Mammogramdate] : 2020

## 2022-03-07 ENCOUNTER — APPOINTMENT (OUTPATIENT)
Dept: ENDOCRINOLOGY | Facility: CLINIC | Age: 82
End: 2022-03-07
Payer: MEDICARE

## 2022-03-07 VITALS
SYSTOLIC BLOOD PRESSURE: 114 MMHG | DIASTOLIC BLOOD PRESSURE: 68 MMHG | HEART RATE: 69 BPM | WEIGHT: 170 LBS | OXYGEN SATURATION: 97 % | BODY MASS INDEX: 27.98 KG/M2 | HEIGHT: 65.5 IN | TEMPERATURE: 97.3 F

## 2022-03-07 PROCEDURE — ZZZZZ: CPT

## 2022-03-07 PROCEDURE — 99213 OFFICE O/P EST LOW 20 MIN: CPT | Mod: 25

## 2022-03-07 PROCEDURE — 96372 THER/PROPH/DIAG INJ SC/IM: CPT

## 2022-03-07 PROCEDURE — 77080 DXA BONE DENSITY AXIAL: CPT

## 2022-03-07 RX ORDER — DENOSUMAB 60 MG/ML
60 INJECTION SUBCUTANEOUS
Qty: 1 | Refills: 0 | Status: COMPLETED | OUTPATIENT
Start: 2022-03-07

## 2022-03-07 RX ADMIN — DENOSUMAB 0 MG/ML: 60 INJECTION SUBCUTANEOUS at 00:00

## 2022-03-08 NOTE — PROCEDURE
[FreeTextEntry1] : Bone mineral density: 03/07/2022 \par Indication: vs. 2020\par Spine: not performed\par Total hip:  -2.3 osteopenia, no significant change\par Femoral neck: -2.2 osteopenia, no significant change\par Proximal radius: -1.7 osteopenia, no significant change\par \par Bone mineral density: 02/12/2020 \par Indication: vs. 2018\par Spine: not performed\par Total hip: -2.1 osteopenia, no significant change\par Femoral neck: -2.1 osteopenia, +8.6%\par Proximal radius: -1.5 osteopenia, +3.5%\par \par Bone mineral density 1/17/18\par indication: compare to 2015 \par spine n/a \par total hip -2.3 osteopenia, no significant change , +11.6% vs 2011\par femoral neck -2.6 osteoporosis, no significant change vs 2015, +10.0% vs 2011\par proximal radius -1.9 osteopenia, no significant change +4.7% vs 2011\par \par bone mineral density test performed 12/11/15\par Spine -0.9, normal,  +3.5% vs 2013\par Total hip -2.3, osteopenia, +5.6% versus 2013\par femoral neck -2.4, osteopenia, 6.8% versus 2013\par Proximal radius -1.9 , osteopenia, No significant change versus 2013

## 2022-03-08 NOTE — ASSESSMENT
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 \par \par Previously treated with Actonel. Tolerating Prolia since 11/2011. Tolerating well. No thigh pain, no interval fx. Normal Ca. No ONJ. BMD 12/2015 improved. BMD 2016 and 2018 markedly improved vs 2011. BMD 2/2020 indicated stable osteopenia in total hip and improving osteopenia in femoral neck and proximal radius. BMD 3/2022 indicates stable osteopenia in hip and proximal radius. BMD results reviewed w/ pt. Continue Prolia, buy and bill.\par \par Discussed transitioning to oral bisphosphonates to benefit from future drug holiday vs. c/w Prolia. Pt elects to c/w Prolia.\par \par HLD well controlled on current medication.\par \par Labs 12/2021 reviewed: Ca 8.8, normal. TSH 2.10, normal. Creatinine 0.79, normal.\par \par F/u in 6 months

## 2022-03-08 NOTE — END OF VISIT
[FreeTextEntry3] : I, Clayton Lees, authored this note working as a medical scribe for Dr. Walls.  03/07/2022. 11:45AM. This note was authored by the medical scribe for me. I have reviewed, edited, and revised the note as needed. I am in agreement with the exam findings, imaging findings, and treatment plan.  Brian Walls MD

## 2022-03-08 NOTE — HISTORY OF PRESENT ILLNESS
[Risedronate (Actonel)] : Risedronate [Denosumab (Prolia)] : Denosumab [FreeTextEntry1] : No significant health change. No interval surgery's, fractures, health change, or change in medications.\par \par Prior Actonel use for many years.On Prolia since 11/2011. Tolerating well. No thigh pain, no interval fx. Normal Ca. Last DDS within past 6 months. No ONJ. Not planning major dental work. BMD 12/2015 improved. BMD 2016 and 2018 stable. 1/2018 R wrist fx after fall on broken concrete, in cast until she had a thumb tendon rupture and surgery April 3rd 2018, followed by PT. Pt reports broken hand 12/2019, fall from standing height. BMD 2/2020 indicated stable osteopenia in total hip and improving osteopenia in femoral neck and proximal radius.\par \par Pt had ear surgery to make canal bigger due to scar tissue.\par \par Pt had prior cataract surgery.\par \par Pt will have 2021 PET scan for lung nodules.

## 2022-04-20 ENCOUNTER — APPOINTMENT (OUTPATIENT)
Dept: INTERNAL MEDICINE | Facility: CLINIC | Age: 82
End: 2022-04-20
Payer: MEDICARE

## 2022-04-20 VITALS — SYSTOLIC BLOOD PRESSURE: 114 MMHG | DIASTOLIC BLOOD PRESSURE: 70 MMHG

## 2022-04-20 VITALS
HEART RATE: 71 BPM | HEIGHT: 65 IN | DIASTOLIC BLOOD PRESSURE: 60 MMHG | SYSTOLIC BLOOD PRESSURE: 112 MMHG | RESPIRATION RATE: 17 BRPM | BODY MASS INDEX: 28.32 KG/M2 | OXYGEN SATURATION: 97 % | WEIGHT: 170 LBS

## 2022-04-20 PROCEDURE — 36415 COLL VENOUS BLD VENIPUNCTURE: CPT

## 2022-04-20 PROCEDURE — 99214 OFFICE O/P EST MOD 30 MIN: CPT | Mod: 25

## 2022-04-20 PROCEDURE — 93000 ELECTROCARDIOGRAM COMPLETE: CPT

## 2022-04-21 ENCOUNTER — NON-APPOINTMENT (OUTPATIENT)
Age: 82
End: 2022-04-21

## 2022-04-21 ENCOUNTER — APPOINTMENT (OUTPATIENT)
Dept: NUCLEAR MEDICINE | Facility: IMAGING CENTER | Age: 82
End: 2022-04-21

## 2022-04-21 LAB
ALBUMIN SERPL ELPH-MCNC: 4.4 G/DL
ALP BLD-CCNC: 63 U/L
ALT SERPL-CCNC: 14 U/L
ANION GAP SERPL CALC-SCNC: 15 MMOL/L
AST SERPL-CCNC: 22 U/L
BASOPHILS # BLD AUTO: 0.07 K/UL
BASOPHILS NFR BLD AUTO: 0.9 %
BILIRUB SERPL-MCNC: 0.4 MG/DL
BUN SERPL-MCNC: 24 MG/DL
CALCIUM SERPL-MCNC: 9.4 MG/DL
CHLORIDE SERPL-SCNC: 104 MMOL/L
CO2 SERPL-SCNC: 21 MMOL/L
CREAT SERPL-MCNC: 0.82 MG/DL
EGFR: 71 ML/MIN/1.73M2
EOSINOPHIL # BLD AUTO: 0.15 K/UL
EOSINOPHIL NFR BLD AUTO: 1.9 %
GLUCOSE SERPL-MCNC: 84 MG/DL
HCT VFR BLD CALC: 49.9 %
HGB BLD-MCNC: 15.5 G/DL
IMM GRANULOCYTES NFR BLD AUTO: 0.4 %
LYMPHOCYTES # BLD AUTO: 1.87 K/UL
LYMPHOCYTES NFR BLD AUTO: 23.9 %
MAN DIFF?: NORMAL
MCHC RBC-ENTMCNC: 28.4 PG
MCHC RBC-ENTMCNC: 31.1 GM/DL
MCV RBC AUTO: 91.6 FL
MONOCYTES # BLD AUTO: 0.74 K/UL
MONOCYTES NFR BLD AUTO: 9.5 %
NEUTROPHILS # BLD AUTO: 4.95 K/UL
NEUTROPHILS NFR BLD AUTO: 63.4 %
NT-PROBNP SERPL-MCNC: 87 PG/ML
PLATELET # BLD AUTO: 251 K/UL
POTASSIUM SERPL-SCNC: 4.6 MMOL/L
PROT SERPL-MCNC: 6.8 G/DL
RBC # BLD: 5.45 M/UL
RBC # FLD: 13.8 %
SODIUM SERPL-SCNC: 140 MMOL/L
WBC # FLD AUTO: 7.81 K/UL

## 2022-04-29 ENCOUNTER — APPOINTMENT (OUTPATIENT)
Dept: NUCLEAR MEDICINE | Facility: IMAGING CENTER | Age: 82
End: 2022-04-29

## 2022-04-29 ENCOUNTER — OUTPATIENT (OUTPATIENT)
Dept: OUTPATIENT SERVICES | Facility: HOSPITAL | Age: 82
LOS: 1 days | End: 2022-04-29
Payer: MEDICARE

## 2022-04-29 ENCOUNTER — APPOINTMENT (OUTPATIENT)
Dept: NUCLEAR MEDICINE | Facility: IMAGING CENTER | Age: 82
End: 2022-04-29
Payer: MEDICARE

## 2022-04-29 DIAGNOSIS — R91.8 OTHER NONSPECIFIC ABNORMAL FINDING OF LUNG FIELD: ICD-10-CM

## 2022-04-29 PROCEDURE — A9552: CPT

## 2022-04-29 PROCEDURE — 78815 PET IMAGE W/CT SKULL-THIGH: CPT | Mod: 26,PI,MH

## 2022-04-29 PROCEDURE — 78815 PET IMAGE W/CT SKULL-THIGH: CPT

## 2022-06-09 ENCOUNTER — APPOINTMENT (OUTPATIENT)
Dept: CARDIOLOGY | Facility: CLINIC | Age: 82
End: 2022-06-09

## 2022-06-09 PROCEDURE — 93306 TTE W/DOPPLER COMPLETE: CPT

## 2022-06-29 ENCOUNTER — NON-APPOINTMENT (OUTPATIENT)
Age: 82
End: 2022-06-29

## 2022-06-29 ENCOUNTER — APPOINTMENT (OUTPATIENT)
Dept: THORACIC SURGERY | Facility: CLINIC | Age: 82
End: 2022-06-29

## 2022-06-29 VITALS
BODY MASS INDEX: 28.32 KG/M2 | WEIGHT: 170 LBS | HEIGHT: 65 IN | OXYGEN SATURATION: 96 % | DIASTOLIC BLOOD PRESSURE: 85 MMHG | HEART RATE: 66 BPM | SYSTOLIC BLOOD PRESSURE: 144 MMHG

## 2022-06-29 PROCEDURE — 99205 OFFICE O/P NEW HI 60 MIN: CPT

## 2022-06-29 RX ORDER — ASPIRIN 81 MG
81 TABLET, DELAYED RELEASE (ENTERIC COATED) ORAL
Refills: 0 | Status: DISCONTINUED | COMMUNITY
End: 2022-06-29

## 2022-06-30 NOTE — CONSULT LETTER
[Dear  ___] : Dear  [unfilled], [Courtesy Letter:] : I had the pleasure of seeing your patient, [unfilled], in my office today. [Please see my note below.] : Please see my note below. [Sincerely,] : Sincerely, [FreeTextEntry2] : Dr. Vlad Mead (Card/Ref) [FreeTextEntry3] : Nando Baker MD, FACS \par Vice Chairperson, Thoracic Surgery, [facility] Department of Cardiovascular & Thoracic Surgery \par , [facility] [employer] [facility] [employer] [facility] \par \par

## 2022-06-30 NOTE — HISTORY OF PRESENT ILLNESS
[FreeTextEntry1] : Ms. DUSTIN NEIL, 81 year old female,former smoker (1 PPD x 30 years; Quit 1990), w/ hx of COPD, HLD Osteoporosis (Last Prolia injection March, 2022), Dilated thoracic aorta and pulmonary nodules. Referred to office by Dr. Mead for further evaluation and treatment. \par \par PFTs on 3/12/21: FVC: 115%; FEV1: 106%; DLCO: 69%\par \par PET/CT on 4/29/22: \par - FDG-avid lobulated nodule in the lingula is increased in size and metabolism, measuring 1.1 cm, SUV 7.7 (image 96); previously 0.9 cm, SUV 3.1. \par A minimally FDG-avid peripherally situated left upper lobe nodule is not significantly changed in size, or metabolism, measuring 1 cm, SUV 1.8 (image 77); previous SUV 1.8. Non-FDG-avid biapical scarring, unchanged.\par \par Today, patient denies worsening SOB, chest pain, cough, hemoptysis, fever, chills, night sweats, lightheadedness or dizziness. Lives alone; Independently performs activities of daily living. Lives in a 3 story home and is able to move around it freely with no issue. \par

## 2022-06-30 NOTE — PHYSICAL EXAM
[Fully active, able to carry on all pre-disease performance without restriction] : Status 0 - Fully active, able to carry on all pre-disease performance without restriction [General Appearance - Alert] : alert [General Appearance - In No Acute Distress] : in no acute distress [General Appearance - Well Nourished] : well nourished [General Appearance - Well Developed] : well developed [Sclera] : the sclera and conjunctiva were normal [Extraocular Movements] : extraocular movements were intact [Hearing Threshold Finger Rub Not [place]] : hearing was normal [Neck Appearance] : the appearance of the neck was normal [Neck Cervical Mass (___cm)] : no neck mass was observed [Jugular Venous Distention Increased] : there was no jugular-venous distention [] : no respiratory distress [Respiration, Rhythm And Depth] : normal respiratory rhythm and effort [Exaggerated Use Of Accessory Muscles For Inspiration] : no accessory muscle use [Heart Rate And Rhythm] : heart rate was normal and rhythm regular [Examination Of The Chest] : the chest was normal in appearance [Chest Visual Inspection Thoracic Asymmetry] : no chest asymmetry [Diminished Respiratory Excursion] : normal chest expansion [2+] : left 2+ [Breast Appearance] : normal in appearance [Breast Palpation Mass] : no palpable masses [Bowel Sounds] : normal bowel sounds [Abdomen Soft] : soft [Abdomen Tenderness] : non-tender [Cervical Lymph Nodes Enlarged Posterior Bilaterally] : posterior cervical [Cervical Lymph Nodes Enlarged Anterior Bilaterally] : anterior cervical [Supraclavicular Lymph Nodes Enlarged Bilaterally] : supraclavicular [No CVA Tenderness] : no ~M costovertebral angle tenderness [No Spinal Tenderness] : no spinal tenderness [Abnormal Walk] : normal gait [Nail Clubbing] : no clubbing  or cyanosis of the fingernails [Musculoskeletal - Swelling] : no joint swelling seen [Skin Color & Pigmentation] : normal skin color and pigmentation [No Focal Deficits] : no focal deficits [Oriented To Time, Place, And Person] : oriented to person, place, and time [FreeTextEntry1] : Deferred

## 2022-06-30 NOTE — ASSESSMENT
[FreeTextEntry1] : Ms. DUSTIN NEIL, 81 year old female,former smoker (1 PPD x 30 years; Quit 1990), w/ hx of COPD, HLD Osteoporosis (Last Prolia injection March, 2022), Dilated thoracic aorta and pulmonary nodules. Referred to office by Dr. Mead for further evaluation and treatment regarding PET avid, lobulated nodule in the lingula. \par \par I have independently reviewed the medical records and imaging at the time of this office consultation. Nodule within the lingula is highly suspicious for malignancy.  Discussed surgery for definitive tissue diagnosis, which will be needed to dictate further treatment. Patient is hesitant to undergo surgery at this time due to her age and concern with recovery process. Also discussed SBRT as well as more conservative options of continuing to monitor with surveillance CT scans at regular intervals; Discussed high risk for disease progression without definitive diagnosis and appropriate treatment. At this time, advised patient to think about all options. She was advised by Cardiology to undergo a Stress Test. Strongly encourage to undergo testing as this would help determine surgical risk. Recommended to return to clinic after stress test, via telehealth, to discuss findings and to re-discuss treatment options. \par \par I have independently reviewed the medical records and imaging at the time of this office consultation, and discussed the following interpretations with the patient:\par \par I personally performed the services described in the documentation, reviewed the documentation recorded by the scribe in my presence and it accurately and completely records my words and actions.\par \par LETHA, DEIRDRE Tavarez, am scribing for and the presence of TENZIN Carreon, the following sections HISTORY OF PRESENT ILLNESS, PAST MEDICAL/FAMILY/SOCIAL HISTORY; REVIEW OF SYSTEMS; VITAL SIGNS; PHYSICAL EXAM; DISPOSITION.\par \par \par \par \par

## 2022-07-19 ENCOUNTER — RESULT CHARGE (OUTPATIENT)
Age: 82
End: 2022-07-19

## 2022-07-20 ENCOUNTER — APPOINTMENT (OUTPATIENT)
Dept: INTERNAL MEDICINE | Facility: CLINIC | Age: 82
End: 2022-07-20

## 2022-07-20 VITALS
SYSTOLIC BLOOD PRESSURE: 124 MMHG | OXYGEN SATURATION: 98 % | BODY MASS INDEX: 27.82 KG/M2 | WEIGHT: 167 LBS | HEIGHT: 65 IN | HEART RATE: 69 BPM | DIASTOLIC BLOOD PRESSURE: 60 MMHG

## 2022-07-20 VITALS — DIASTOLIC BLOOD PRESSURE: 80 MMHG | SYSTOLIC BLOOD PRESSURE: 120 MMHG

## 2022-07-20 DIAGNOSIS — I65.29 OCCLUSION AND STENOSIS OF UNSPECIFIED CAROTID ARTERY: ICD-10-CM

## 2022-07-20 PROCEDURE — 93000 ELECTROCARDIOGRAM COMPLETE: CPT

## 2022-07-20 PROCEDURE — 99214 OFFICE O/P EST MOD 30 MIN: CPT | Mod: 25

## 2022-07-20 NOTE — ASSESSMENT
[FreeTextEntry1] : \par \par ecg---done in office, today for METZGER; SR; WNL\par \par imp---pulmonary nodules---FDG -avid nodule that has increased in size; pt. is former smoker; is \par          being seen by thoracic surgery\par          exertional dyspnea---chronic; unchanged probably secondary to....\par          COPD--centrilobular emphysema on CT\par          pulmonary hypertension---mild elevation of PASP on echo; probably on pulmonary basis;\par          normal LV function; w/o significant MR; BNP=87\par          dilation of TA--stable dimensions (4.0 cm. at SOV) as of 6/9/22 echo\par          carotid plaque---see PI\par \par plan---thallium stress 7/25/22\par            seeking 2nd surgical opinion\par            will discuss surgery with pt. and thoracic surgery after thallium stress\par            OV depending on timing of surgery\par                                             \par

## 2022-07-20 NOTE — REASON FOR VISIT
[Hyperlipidemia] : hyperlipidemia [Carotid, Aortic and Peripheral Vascular Disease] : carotid, aortic and peripheral vascular disease [FreeTextEntry1] : \par \par generally well;\par undergoing evaluation for suspicious pulmonary lesion;\par will get nuclear stress 7/25/22.\par reports no c-v sx. other than mild exertional SOB on occasion;\par no chest pain, PND, orthopnea or pre-syncope\par \par mild carotid disease noted on recent [employer] exam;\par on a statin; takes ASA at least 2 per day for chronic HA's; declining neuro. f/u

## 2022-07-21 ENCOUNTER — NON-APPOINTMENT (OUTPATIENT)
Age: 82
End: 2022-07-21

## 2022-07-25 ENCOUNTER — APPOINTMENT (OUTPATIENT)
Dept: CARDIOLOGY | Facility: CLINIC | Age: 82
End: 2022-07-25

## 2022-07-25 PROCEDURE — 93015 CV STRESS TEST SUPVJ I&R: CPT

## 2022-07-25 PROCEDURE — 78452 HT MUSCLE IMAGE SPECT MULT: CPT

## 2022-07-25 PROCEDURE — A9500: CPT

## 2022-07-28 ENCOUNTER — APPOINTMENT (OUTPATIENT)
Dept: THORACIC SURGERY | Facility: CLINIC | Age: 82
End: 2022-07-28

## 2022-07-28 VITALS
HEART RATE: 72 BPM | RESPIRATION RATE: 16 BRPM | DIASTOLIC BLOOD PRESSURE: 90 MMHG | SYSTOLIC BLOOD PRESSURE: 138 MMHG | HEIGHT: 65 IN | WEIGHT: 172 LBS | BODY MASS INDEX: 28.66 KG/M2 | OXYGEN SATURATION: 98 %

## 2022-07-28 PROCEDURE — 99215 OFFICE O/P EST HI 40 MIN: CPT

## 2022-07-29 NOTE — ASSESSMENT
[FreeTextEntry1] : Ms. DUSTIN NEIL, 81 year old female, former smoker (1 PPD x 30 years; Quit 1990), w/ hx HLD, COPD, Osteoporosis (Last Prolia injection March 2022), Dilated thoracic aorta, and pulmonary nodules. Referred by cardiologist Dr. Mead for further evaluation for PET avid lobulated nodule in the lingula. \par \par \par I have reviewed the patient's medical records and diagnostic images at time of this office consultation and have made the following recommendation:\par 1. PET scan reviewed, agreed with Dr. Baker that she should undergo surgical resection of lung nodule, all risks and benefits and alternatives explained to patient, all questions answered, patient will call us back with her decision.\par 2. If she agreed to proceed with surgery, then she will require Medical and Cardiac clearances, PST and COVID testing prior to surgery (Lt VATS Robotic-assisted, lingular segmentectomy, MLND).\par \par \par I personally performed the services described in the documentation, reviewed the documentation recorded by the scribe in my presence and it accurately and completely records my words and actions.\par \par I, Nila Barr NP, am scribing for and the presence of BRIAN Palomo, the following sections HISTORY OF PRESENT ILLNESS, PAST MEDICAL/FAMILY/SOCIAL HISTORY; REVIEW OF SYSTEMS; VITAL SIGNS; PHYSICAL EXAM; DISPOSITION.\par \par \par \par \par \par \par

## 2022-07-29 NOTE — DATA REVIEWED
[FreeTextEntry1] : I have independently reviewed the following:\par PFTs on 3/12/21: FVC: 115%; FEV1: 106%; DLCO: 69%\par PET/CT on 4/29/22

## 2022-07-29 NOTE — CONSULT LETTER
[Dear  ___] : Dear  [unfilled], [Courtesy Letter:] : I had the pleasure of seeing your patient, [unfilled], in my office today. [Please see my note below.] : Please see my note below. [Sincerely,] : Sincerely, [FreeTextEntry2] : Dr. Vlad Mead (Card/Ref) [FreeTextEntry3] : Nando Baker MD, FACS \par Vice Chairperson, Thoracic Surgery, [facility] Department of Cardiovascular & Thoracic Surgery \par , [facility] [employer] at [facility] \par \par

## 2022-07-29 NOTE — HISTORY OF PRESENT ILLNESS
[FreeTextEntry1] : Ms. DUSTIN NEIL, 81 year old female, former smoker (1 PPD x 30 years; Quit 1990), w/ hx HLD, COPD, Osteoporosis (Last Prolia injection March 2022), Dilated thoracic aorta, and pulmonary nodules. Referred by cardiologist Dr. Mead for further evaluation for PET avid lobulated nodule in the lingula. \par \par PFTs on 3/12/21: FVC: 115%; FEV1: 106%; DLCO: 69%\par \par PET/CT on 4/29/22: \par - FDG-avid lobulated nodule in the lingula has increased in size and metabolism, measuring 1.1cm SUV=7.7 (image 96; previously 0.9 cm SUV=3.1) \par - a minimally FDG-avid peripherally situated YAAKOV nodule is not significantly changed in size, or metabolism, measuring 1cm SUV=1.8 (image 77; previous SUV=1.8)\par - Non-FDG-avid biapical scarring, unchanged.\par \par Nuclear Stress Test on 7/25/22 by Dr. Mead/Dr. Lisker showed normal myocardial perfusion scan, w/ no evidence of infarction or inducible ischemia.\par \par Patient is here today for a second opinion, consulted with Dr. Baker on 6/29/22. Admits to SOB on exertion. Denies CP or cough.\par \par

## 2022-08-03 ENCOUNTER — APPOINTMENT (OUTPATIENT)
Dept: THORACIC SURGERY | Facility: CLINIC | Age: 82
End: 2022-08-03

## 2022-08-03 VITALS
HEIGHT: 65.5 IN | OXYGEN SATURATION: 99 % | DIASTOLIC BLOOD PRESSURE: 75 MMHG | WEIGHT: 172 LBS | HEART RATE: 71 BPM | SYSTOLIC BLOOD PRESSURE: 113 MMHG | RESPIRATION RATE: 17 BRPM | BODY MASS INDEX: 28.31 KG/M2

## 2022-08-03 PROCEDURE — 99214 OFFICE O/P EST MOD 30 MIN: CPT

## 2022-08-03 NOTE — PHYSICAL EXAM
[] : no respiratory distress [Respiration, Rhythm And Depth] : normal respiratory rhythm and effort [Exaggerated Use Of Accessory Muscles For Inspiration] : no accessory muscle use [Heart Rate And Rhythm] : heart rate was normal and rhythm regular [Examination Of The Chest] : the chest was normal in appearance [Chest Visual Inspection Thoracic Asymmetry] : no chest asymmetry [Diminished Respiratory Excursion] : normal chest expansion [2+] : left 2+ [Bowel Sounds] : normal bowel sounds [Abdomen Soft] : soft [Abdomen Tenderness] : non-tender [Cervical Lymph Nodes Enlarged Posterior Bilaterally] : posterior cervical [Cervical Lymph Nodes Enlarged Anterior Bilaterally] : anterior cervical [Supraclavicular Lymph Nodes Enlarged Bilaterally] : supraclavicular [Involuntary Movements] : no involuntary movements were seen [Skin Color & Pigmentation] : normal skin color and pigmentation [Skin Turgor] : normal skin turgor [Oriented To Time, Place, And Person] : oriented to person, place, and time [Fully active, able to carry on all pre-disease performance without restriction] : Status 0 - Fully active, able to carry on all pre-disease performance without restriction

## 2022-08-08 NOTE — HISTORY OF PRESENT ILLNESS
[FreeTextEntry1] : Ms. DUSTIN NEIL, 81 year old female,former smoker (1 PPD x 30 years; Quit 1990), w/ hx of COPD, HLD Osteoporosis (Last Prolia injection March, 2022), Dilated thoracic aorta and pulmonary nodules. Referred to office by Dr. Mead for further evaluation and treatment regarding PET avid, lobulated nodule in the lingula. \par \par Seen on 6/29.. Discussed surgery vs SBRT. Patient hesitant to undergo surgery  due to her age and concern with recovery process. Recommended to undergo stress test to determine surgical risk. Will regroup after. \par \par

## 2022-08-08 NOTE — ASSESSMENT
[FreeTextEntry1] : Ms. DUSTIN NEIL, 81 year old female,former smoker (1 PPD x 30 years; Quit 1990), w/ hx of COPD, HLD Osteoporosis (Last Prolia injection March, 2022), Dilated thoracic aorta and pulmonary nodules. Referred to office by Dr. Mead for further evaluation and treatment regarding PET avid, lobulated nodule in the lingula. \par \par Here today to discuss stress test results as well as further plan of care. Stress test within acceptable limits. Discussed Left VATS, Robotic Assisted left upper lobe wedge, possible lingulectomy. Risks/benefits as well as recovery process extensively reviewed. Patient is agreeable to proceed. Additionally, she endorses that she lives in a split level home, where stairs are required to reach the essential rooms. Discussed importance of exercising pre-operatively for conditioning. Furthermore, will order PT consult while inpatient to help with post op recovery. Advised to have family accompany her the first week postoperatively to help assisted with daily activities. \par \par I personally performed the services described in the documentation, reviewed the documentation recorded by the scribe in my presence and it accurately and completely records my words and actions.\par \par LETHA, Cely Billings, ANP-C, am scribing for and the presence of TENZIN Carreon, the following sections HISTORY OF PRESENT ILLNESS, PAST MEDICAL/FAMILY/SOCIAL HISTORY; REVIEW OF SYSTEMS; VITAL SIGNS; PHYSICAL EXAM; DISPOSITION.\par \par \par \par \par \par \par \par

## 2022-08-08 NOTE — DATA REVIEWED
[FreeTextEntry1] : Stress test on 7/25/22:\par Normal LV function; LVEF >70%; \par Normal myocardial perfusion scan, with no evidence of infarction or inducible ischemic. \par \par PET/CT on 4/29/22: \par - FDG-avid lobulated nodule in the lingula is increased in size and metabolism, measuring 1.1 cm, SUV 7.7 (image 96); previously 0.9 cm, SUV 3.1. \par A minimally FDG-avid peripherally situated left upper lobe nodule is not significantly changed in size, or metabolism, measuring 1 cm, SUV 1.8 (image 77); previous SUV 1.8. Non-FDG-avid biapical scarring, unchanged.\par \par PFTs on 3/12/21: FVC: 115%; FEV1: 106%; DLCO: 69%\par

## 2022-08-08 NOTE — CONSULT LETTER
[Dear  ___] : Dear  [unfilled], [Courtesy Letter:] : I had the pleasure of seeing your patient, [unfilled], in my office today. [Please see my note below.] : Please see my note below. [Sincerely,] : Sincerely, [FreeTextEntry2] : Dr. Vlad Mead (Card/Ref) [FreeTextEntry3] : Nando Baker MD, FACS \par Vice Chairperson, Thoracic Surgery, [facility] Department of Cardiovascular & Thoracic Surgery \par , [facility] [employer] at [facility] \par \par

## 2022-08-09 ENCOUNTER — OUTPATIENT (OUTPATIENT)
Dept: OUTPATIENT SERVICES | Facility: HOSPITAL | Age: 82
LOS: 1 days | End: 2022-08-09

## 2022-08-09 ENCOUNTER — APPOINTMENT (OUTPATIENT)
Dept: INTERNAL MEDICINE | Facility: CLINIC | Age: 82
End: 2022-08-09

## 2022-08-09 VITALS
TEMPERATURE: 98 F | RESPIRATION RATE: 16 BRPM | SYSTOLIC BLOOD PRESSURE: 140 MMHG | OXYGEN SATURATION: 96 % | HEIGHT: 65 IN | WEIGHT: 171.96 LBS | DIASTOLIC BLOOD PRESSURE: 90 MMHG | HEART RATE: 74 BPM

## 2022-08-09 DIAGNOSIS — Z98.890 OTHER SPECIFIED POSTPROCEDURAL STATES: Chronic | ICD-10-CM

## 2022-08-09 DIAGNOSIS — I07.1 RHEUMATIC TRICUSPID INSUFFICIENCY: ICD-10-CM

## 2022-08-09 DIAGNOSIS — Z90.49 ACQUIRED ABSENCE OF OTHER SPECIFIED PARTS OF DIGESTIVE TRACT: Chronic | ICD-10-CM

## 2022-08-09 DIAGNOSIS — R91.8 OTHER NONSPECIFIC ABNORMAL FINDING OF LUNG FIELD: ICD-10-CM

## 2022-08-09 DIAGNOSIS — Z98.49 CATARACT EXTRACTION STATUS, UNSPECIFIED EYE: Chronic | ICD-10-CM

## 2022-08-09 DIAGNOSIS — R06.09 OTHER FORMS OF DYSPNEA: ICD-10-CM

## 2022-08-09 LAB
ANION GAP SERPL CALC-SCNC: 13 MMOL/L — SIGNIFICANT CHANGE UP (ref 7–14)
BLD GP AB SCN SERPL QL: NEGATIVE — SIGNIFICANT CHANGE UP
BUN SERPL-MCNC: 22 MG/DL — SIGNIFICANT CHANGE UP (ref 7–23)
CALCIUM SERPL-MCNC: 9.6 MG/DL — SIGNIFICANT CHANGE UP (ref 8.4–10.5)
CHLORIDE SERPL-SCNC: 101 MMOL/L — SIGNIFICANT CHANGE UP (ref 98–107)
CO2 SERPL-SCNC: 25 MMOL/L — SIGNIFICANT CHANGE UP (ref 22–31)
CREAT SERPL-MCNC: 0.85 MG/DL — SIGNIFICANT CHANGE UP (ref 0.5–1.3)
EGFR: 68 ML/MIN/1.73M2 — SIGNIFICANT CHANGE UP
GLUCOSE SERPL-MCNC: 89 MG/DL — SIGNIFICANT CHANGE UP (ref 70–99)
HCT VFR BLD CALC: 50.4 % — HIGH (ref 34.5–45)
HGB BLD-MCNC: 15.9 G/DL — HIGH (ref 11.5–15.5)
MCHC RBC-ENTMCNC: 28.4 PG — SIGNIFICANT CHANGE UP (ref 27–34)
MCHC RBC-ENTMCNC: 31.5 GM/DL — LOW (ref 32–36)
MCV RBC AUTO: 90.2 FL — SIGNIFICANT CHANGE UP (ref 80–100)
NRBC # BLD: 0 /100 WBCS — SIGNIFICANT CHANGE UP
NRBC # FLD: 0 K/UL — SIGNIFICANT CHANGE UP
PLATELET # BLD AUTO: 256 K/UL — SIGNIFICANT CHANGE UP (ref 150–400)
POTASSIUM SERPL-MCNC: 4.4 MMOL/L — SIGNIFICANT CHANGE UP (ref 3.5–5.3)
POTASSIUM SERPL-SCNC: 4.4 MMOL/L — SIGNIFICANT CHANGE UP (ref 3.5–5.3)
RBC # BLD: 5.59 M/UL — HIGH (ref 3.8–5.2)
RBC # FLD: 13.4 % — SIGNIFICANT CHANGE UP (ref 10.3–14.5)
RH IG SCN BLD-IMP: POSITIVE — SIGNIFICANT CHANGE UP
SODIUM SERPL-SCNC: 139 MMOL/L — SIGNIFICANT CHANGE UP (ref 135–145)
WBC # BLD: 7.55 K/UL — SIGNIFICANT CHANGE UP (ref 3.8–10.5)
WBC # FLD AUTO: 7.55 K/UL — SIGNIFICANT CHANGE UP (ref 3.8–10.5)

## 2022-08-09 PROCEDURE — 99441: CPT | Mod: 95

## 2022-08-09 RX ORDER — SODIUM CHLORIDE 9 MG/ML
1000 INJECTION, SOLUTION INTRAVENOUS
Refills: 0 | Status: DISCONTINUED | OUTPATIENT
Start: 2022-08-23 | End: 2022-08-24

## 2022-08-09 NOTE — H&P PST ADULT - HISTORY OF PRESENT ILLNESS
81 year old female presents to presurgical testing with diagnosis of other nonspecific abnormal finding of lung field scheduled for robotic assisted left video assisted thoracoscopy and left upper lobe wedge resection possible lingulectomy. Pt with increasing left upper lung nodule.  81 year old female PMH severe tricuspid regurgitation, pulmonary htn, thoracic aorta dilation presents to presurgical testing with diagnosis of other nonspecific abnormal finding of lung field scheduled for robotic assisted left video assisted thoracoscopy and left upper lobe wedge resection possible lingulectomy. Pt with increasing left upper lung nodule.

## 2022-08-09 NOTE — H&P PST ADULT - NSICDXPASTMEDICALHX_GEN_ALL_CORE_FT
PAST MEDICAL HISTORY:  Calcification of coronary artery     Dilation of thoracic aorta     H/O headache     H/O pulmonary hypertension     Hard of hearing     History of COPD does not use inhalers    HLD (hyperlipidemia)     Mild depression     Osteoporosis     Other nonspecific abnormal finding of lung field     Pulmonary nodules     Tricuspid regurgitation severe

## 2022-08-09 NOTE — H&P PST ADULT - NEGATIVE OPHTHALMOLOGIC SYMPTOMS
no photophobia/no blurred vision L/no blurred vision R/no pain L/no pain R/no loss of vision L/no loss of vision R

## 2022-08-09 NOTE — H&P PST ADULT - PROBLEM SELECTOR PLAN 1
Patient tentatively scheduled for robotic assisted left video assisted thoracoscopy and left upper lobe wedge resection possible lingulectomy for 8/23/22. Pre-op instructions provided. Pt given verbal and written instructions with teach back on chlorhexidine shampoo and pepcid. Pt verbalized understanding with return demonstration.     Pt instructed to obtain a COVID-19 PCR 3-5 days prior to the procedure. COVID-19 PCR order placed. Pt stated understanding.

## 2022-08-09 NOTE — H&P PST ADULT - CARDIOVASCULAR
normal/regular rate and rhythm/S1 S2 present/no gallops/no rub/no murmur/no pedal edema/vascular details… normal/regular rate and rhythm/S1 S2 present/no gallops/no rub/no pedal edema/murmur/vascular

## 2022-08-09 NOTE — H&P PST ADULT - NSICDXFAMILYHX_GEN_ALL_CORE_FT
FAMILY HISTORY:  Mother  Still living? Unknown  Family history of cervical cancer, Age at diagnosis: Age Unknown    Sibling  Still living? Unknown  FH: type 2 diabetes, Age at diagnosis: Age Unknown

## 2022-08-10 PROBLEM — M81.0 AGE-RELATED OSTEOPOROSIS WITHOUT CURRENT PATHOLOGICAL FRACTURE: Chronic | Status: ACTIVE | Noted: 2022-08-09

## 2022-08-10 PROBLEM — Z87.09 PERSONAL HISTORY OF OTHER DISEASES OF THE RESPIRATORY SYSTEM: Chronic | Status: ACTIVE | Noted: 2022-08-09

## 2022-08-10 PROBLEM — R91.8 OTHER NONSPECIFIC ABNORMAL FINDING OF LUNG FIELD: Chronic | Status: ACTIVE | Noted: 2022-08-09

## 2022-08-10 PROBLEM — F32.A DEPRESSION, UNSPECIFIED: Chronic | Status: ACTIVE | Noted: 2022-08-09

## 2022-08-10 PROBLEM — I25.10 ATHEROSCLEROTIC HEART DISEASE OF NATIVE CORONARY ARTERY WITHOUT ANGINA PECTORIS: Chronic | Status: ACTIVE | Noted: 2022-08-09

## 2022-08-10 PROBLEM — Z86.79 PERSONAL HISTORY OF OTHER DISEASES OF THE CIRCULATORY SYSTEM: Chronic | Status: ACTIVE | Noted: 2022-08-09

## 2022-08-10 PROBLEM — E78.5 HYPERLIPIDEMIA, UNSPECIFIED: Chronic | Status: ACTIVE | Noted: 2022-08-09

## 2022-08-10 PROBLEM — I77.810 THORACIC AORTIC ECTASIA: Chronic | Status: ACTIVE | Noted: 2022-08-09

## 2022-08-10 PROBLEM — I07.1 RHEUMATIC TRICUSPID INSUFFICIENCY: Chronic | Status: ACTIVE | Noted: 2022-08-09

## 2022-08-10 PROBLEM — H91.90 UNSPECIFIED HEARING LOSS, UNSPECIFIED EAR: Chronic | Status: ACTIVE | Noted: 2022-08-09

## 2022-08-10 PROBLEM — Z87.898 PERSONAL HISTORY OF OTHER SPECIFIED CONDITIONS: Chronic | Status: ACTIVE | Noted: 2022-08-09

## 2022-08-15 ENCOUNTER — APPOINTMENT (OUTPATIENT)
Dept: INTERNAL MEDICINE | Facility: CLINIC | Age: 82
End: 2022-08-15

## 2022-08-15 VITALS — SYSTOLIC BLOOD PRESSURE: 130 MMHG | DIASTOLIC BLOOD PRESSURE: 80 MMHG

## 2022-08-15 VITALS
BODY MASS INDEX: 28.15 KG/M2 | HEIGHT: 65.5 IN | HEART RATE: 68 BPM | DIASTOLIC BLOOD PRESSURE: 92 MMHG | OXYGEN SATURATION: 95 % | TEMPERATURE: 97.2 F | SYSTOLIC BLOOD PRESSURE: 138 MMHG | WEIGHT: 171 LBS

## 2022-08-15 DIAGNOSIS — D75.1 SECONDARY POLYCYTHEMIA: ICD-10-CM

## 2022-08-15 PROCEDURE — 93000 ELECTROCARDIOGRAM COMPLETE: CPT

## 2022-08-15 PROCEDURE — 99214 OFFICE O/P EST MOD 30 MIN: CPT | Mod: 25

## 2022-08-15 NOTE — ASSESSMENT
[No Further Testing Recommended] : no further testing recommended [As per surgery] : as per surgery [Patient Optimized for Surgery] : Patient optimized for surgery [FreeTextEntry4] : \par ecg---done in office, today for pulmonary hypertension; SR; probably normal; NSCSPT\par \par imp---pulmonary nodule--suspect for neoplasm\par           COPD---cig. smoker for many yrs.; quit in 1990; has mild METZGER; PFTs in Mar. of 2021...normal spirometry; normal lung\par           volumes; decreased DLCO\par           pulmonary hypertension---mild; see echo findings above; probably pulmonary in etiology\par           osteoporosis---on Prolia\par           erythrocytosis---mild; can be followed up post-op.\par \par plan---intraop. cardiac monitoring\par            Pt. is cardiologically ( and medically) clear for planned procedure

## 2022-08-15 NOTE — HISTORY OF PRESENT ILLNESS
[No Pertinent Cardiac History] : no history of aortic stenosis, atrial fibrillation, coronary artery disease, recent myocardial infarction, or implantable device/pacemaker [COPD] : COPD [No Adverse Anesthesia Reaction] : no adverse anesthesia reaction in self or family member [____ METs%] : [unfilled] METs% [Chronic Anticoagulation] : no chronic anticoagulation [Chronic Kidney Disease] : no chronic kidney disease [Diabetes] : no diabetes [FreeTextEntry1] : VATS/poss. lingulectomy [FreeTextEntry2] : 8/23/22 [FreeTextEntry3] : Dr. Nando Baker [FreeTextEntry4] : \par FDG-avid lobulated nodule in lingula that has increased in size and metabolism [FreeTextEntry6] : occasional SOB climbing flight of stairs; no chest discomfort; no PND or orthopnea;\par 02 sat. on current office visit=95% [FreeTextEntry7] : \par echo--June 9, 2022; LVEF=64%; aortic sclerosis w/o significant gradient;\par            mild aortic root dilatation (4 cm. at SOV); severe TR; PASP=49\par \par nuclear stress--8.5 METs; normal study

## 2022-08-15 NOTE — RESULTS/DATA
[] : results reviewed [de-identified] : h/h=15.9/50.4 [de-identified] : normal panel [de-identified] : SR; rsr' in V1; probably normal

## 2022-08-21 LAB — SARS-COV-2 N GENE NPH QL NAA+PROBE: NOT DETECTED

## 2022-08-22 NOTE — ASU PATIENT PROFILE, ADULT - FALL HARM RISK - TYPE OF ASSESSMENT
Pt called stating she still has some pain in her breasts and when she woke up this morning she has some cramping and some pinkish discharge twice  Pt is going to have quant done and if negative we will send over the provera and clomid prescriptions for her per Dr Akshat Black  Admission

## 2022-08-22 NOTE — ASU PATIENT PROFILE, ADULT - VISION (WITH CORRECTIVE LENSES IF THE PATIENT USUALLY WEARS THEM):
wears gasses/Partially impaired: cannot see medication labels or newsprint, but can see obstacles in path, and the surrounding layout; can count fingers at arm's length

## 2022-08-22 NOTE — ASU PATIENT PROFILE, ADULT - FALL HARM RISK - UNIVERSAL INTERVENTIONS
Bed in lowest position, wheels locked, appropriate side rails in place/Call bell, personal items and telephone in reach/Instruct patient to call for assistance before getting out of bed or chair/Non-slip footwear when patient is out of bed/[place] to call system/Physically safe environment - no spills, clutter or unnecessary equipment/Purposeful Proactive Rounding/Room/bathroom lighting operational, light cord in reach

## 2022-08-22 NOTE — ASU PATIENT PROFILE, ADULT - NSICDXPASTSURGICALHX_GEN_ALL_CORE_FT
PAST SURGICAL HISTORY:  History of appendectomy     History of cataract surgery b/l    History of colon resection for intenstinal obstruction 2004    S/P ear surgery several    Status post open reduction and internal fixation (ORIF) of fracture right wrist

## 2022-08-23 ENCOUNTER — APPOINTMENT (OUTPATIENT)
Dept: THORACIC SURGERY | Facility: HOSPITAL | Age: 82
End: 2022-08-23

## 2022-08-23 ENCOUNTER — INPATIENT (INPATIENT)
Facility: HOSPITAL | Age: 82
LOS: 4 days | Discharge: HOME HEALTH | End: 2022-08-28
Attending: THORACIC SURGERY (CARDIOTHORACIC VASCULAR SURGERY) | Admitting: THORACIC SURGERY (CARDIOTHORACIC VASCULAR SURGERY)

## 2022-08-23 ENCOUNTER — RESULT REVIEW (OUTPATIENT)
Age: 82
End: 2022-08-23

## 2022-08-23 VITALS
TEMPERATURE: 99 F | HEART RATE: 72 BPM | WEIGHT: 171.96 LBS | RESPIRATION RATE: 16 BRPM | DIASTOLIC BLOOD PRESSURE: 72 MMHG | HEIGHT: 65 IN | SYSTOLIC BLOOD PRESSURE: 118 MMHG | OXYGEN SATURATION: 96 %

## 2022-08-23 DIAGNOSIS — R91.8 OTHER NONSPECIFIC ABNORMAL FINDING OF LUNG FIELD: ICD-10-CM

## 2022-08-23 DIAGNOSIS — Z98.49 CATARACT EXTRACTION STATUS, UNSPECIFIED EYE: Chronic | ICD-10-CM

## 2022-08-23 DIAGNOSIS — Z90.49 ACQUIRED ABSENCE OF OTHER SPECIFIED PARTS OF DIGESTIVE TRACT: Chronic | ICD-10-CM

## 2022-08-23 DIAGNOSIS — Z98.890 OTHER SPECIFIED POSTPROCEDURAL STATES: Chronic | ICD-10-CM

## 2022-08-23 PROCEDURE — S2900 ROBOTIC SURGICAL SYSTEM: CPT | Mod: NC

## 2022-08-23 PROCEDURE — 88305 TISSUE EXAM BY PATHOLOGIST: CPT | Mod: 26

## 2022-08-23 PROCEDURE — 88309 TISSUE EXAM BY PATHOLOGIST: CPT | Mod: 26

## 2022-08-23 PROCEDURE — 88307 TISSUE EXAM BY PATHOLOGIST: CPT | Mod: 26

## 2022-08-23 PROCEDURE — 99233 SBSQ HOSP IP/OBS HIGH 50: CPT

## 2022-08-23 PROCEDURE — 32674 THORACOSCOPY LYMPH NODE EXC: CPT

## 2022-08-23 PROCEDURE — 32669 THORACOSCOPY REMOVE SEGMENT: CPT

## 2022-08-23 PROCEDURE — 88342 IMHCHEM/IMCYTCHM 1ST ANTB: CPT | Mod: 26

## 2022-08-23 PROCEDURE — 88331 PATH CONSLTJ SURG 1 BLK 1SPC: CPT | Mod: 26

## 2022-08-23 PROCEDURE — 71045 X-RAY EXAM CHEST 1 VIEW: CPT | Mod: 26

## 2022-08-23 DEVICE — XI STAPLER SUREFORM RELOAD 60 BLACK: Type: IMPLANTABLE DEVICE | Status: FUNCTIONAL

## 2022-08-23 DEVICE — CHEST DRAIN THORACIC ARGYLE PVC 24FR STRAIGHT: Type: IMPLANTABLE DEVICE | Status: FUNCTIONAL

## 2022-08-23 DEVICE — SURGICEL 2 X 14": Type: IMPLANTABLE DEVICE | Status: FUNCTIONAL

## 2022-08-23 DEVICE — STAPLER COVIDIEN TRI-STAPLE CURVED 30MM TAN RELOAD: Type: IMPLANTABLE DEVICE | Status: FUNCTIONAL

## 2022-08-23 DEVICE — XI STAPLER ENDOWRIST RELOAD 30MM WHITE: Type: IMPLANTABLE DEVICE | Status: FUNCTIONAL

## 2022-08-23 RX ORDER — ACETAMINOPHEN 500 MG
1000 TABLET ORAL ONCE
Refills: 0 | Status: COMPLETED | OUTPATIENT
Start: 2022-08-24 | End: 2022-08-24

## 2022-08-23 RX ORDER — ACETAMINOPHEN 500 MG
975 TABLET ORAL ONCE
Refills: 0 | Status: COMPLETED | OUTPATIENT
Start: 2022-08-23 | End: 2022-08-23

## 2022-08-23 RX ORDER — GABAPENTIN 400 MG/1
100 CAPSULE ORAL ONCE
Refills: 0 | Status: COMPLETED | OUTPATIENT
Start: 2022-08-23 | End: 2022-08-23

## 2022-08-23 RX ORDER — MULTIVIT-MIN/FERROUS GLUCONATE 9 MG/15 ML
1 LIQUID (ML) ORAL
Qty: 0 | Refills: 0 | DISCHARGE

## 2022-08-23 RX ORDER — LIDOCAINE 4 G/100G
1 CREAM TOPICAL ONCE
Refills: 0 | Status: COMPLETED | OUTPATIENT
Start: 2022-08-23 | End: 2022-08-23

## 2022-08-23 RX ORDER — ATORVASTATIN CALCIUM 80 MG/1
40 TABLET, FILM COATED ORAL AT BEDTIME
Refills: 0 | Status: DISCONTINUED | OUTPATIENT
Start: 2022-08-23 | End: 2022-08-28

## 2022-08-23 RX ORDER — HYDROMORPHONE HYDROCHLORIDE 2 MG/ML
0.5 INJECTION INTRAMUSCULAR; INTRAVENOUS; SUBCUTANEOUS
Refills: 0 | Status: DISCONTINUED | OUTPATIENT
Start: 2022-08-23 | End: 2022-08-24

## 2022-08-23 RX ORDER — HEPARIN SODIUM 5000 [USP'U]/ML
5000 INJECTION INTRAVENOUS; SUBCUTANEOUS EVERY 8 HOURS
Refills: 0 | Status: DISCONTINUED | OUTPATIENT
Start: 2022-08-23 | End: 2022-08-28

## 2022-08-23 RX ORDER — ASPIRIN/CALCIUM CARB/MAGNESIUM 324 MG
1 TABLET ORAL
Qty: 0 | Refills: 0 | DISCHARGE

## 2022-08-23 RX ORDER — CHOLECALCIFEROL (VITAMIN D3) 125 MCG
1 CAPSULE ORAL
Qty: 0 | Refills: 0 | DISCHARGE

## 2022-08-23 RX ORDER — IPRATROPIUM/ALBUTEROL SULFATE 18-103MCG
3 AEROSOL WITH ADAPTER (GRAM) INHALATION EVERY 6 HOURS
Refills: 0 | Status: DISCONTINUED | OUTPATIENT
Start: 2022-08-23 | End: 2022-08-28

## 2022-08-23 RX ORDER — ONDANSETRON 8 MG/1
4 TABLET, FILM COATED ORAL EVERY 6 HOURS
Refills: 0 | Status: DISCONTINUED | OUTPATIENT
Start: 2022-08-23 | End: 2022-08-28

## 2022-08-23 RX ORDER — SENNA PLUS 8.6 MG/1
2 TABLET ORAL AT BEDTIME
Refills: 0 | Status: DISCONTINUED | OUTPATIENT
Start: 2022-08-23 | End: 2022-08-28

## 2022-08-23 RX ORDER — HEPARIN SODIUM 5000 [USP'U]/ML
5000 INJECTION INTRAVENOUS; SUBCUTANEOUS ONCE
Refills: 0 | Status: COMPLETED | OUTPATIENT
Start: 2022-08-23 | End: 2022-08-23

## 2022-08-23 RX ORDER — NALOXONE HYDROCHLORIDE 4 MG/.1ML
0.1 SPRAY NASAL
Refills: 0 | Status: DISCONTINUED | OUTPATIENT
Start: 2022-08-23 | End: 2022-08-28

## 2022-08-23 RX ORDER — DENOSUMAB 60 MG/ML
0 INJECTION SUBCUTANEOUS
Qty: 0 | Refills: 0 | DISCHARGE

## 2022-08-23 RX ORDER — ACETAMINOPHEN 500 MG
1000 TABLET ORAL ONCE
Refills: 0 | Status: COMPLETED | OUTPATIENT
Start: 2022-08-23 | End: 2022-08-23

## 2022-08-23 RX ORDER — MULTIVIT-MIN/FERROUS GLUCONATE 9 MG/15 ML
1 LIQUID (ML) ORAL DAILY
Refills: 0 | Status: DISCONTINUED | OUTPATIENT
Start: 2022-08-23 | End: 2022-08-23

## 2022-08-23 RX ORDER — HYDROMORPHONE HYDROCHLORIDE 2 MG/ML
30 INJECTION INTRAMUSCULAR; INTRAVENOUS; SUBCUTANEOUS
Refills: 0 | Status: DISCONTINUED | OUTPATIENT
Start: 2022-08-23 | End: 2022-08-24

## 2022-08-23 RX ORDER — SIMVASTATIN 20 MG/1
1 TABLET, FILM COATED ORAL
Qty: 0 | Refills: 0 | DISCHARGE

## 2022-08-23 RX ADMIN — SODIUM CHLORIDE 30 MILLILITER(S): 9 INJECTION, SOLUTION INTRAVENOUS at 19:53

## 2022-08-23 RX ADMIN — HEPARIN SODIUM 5000 UNIT(S): 5000 INJECTION INTRAVENOUS; SUBCUTANEOUS at 11:24

## 2022-08-23 RX ADMIN — LIDOCAINE 1 PATCH: 4 CREAM TOPICAL at 19:54

## 2022-08-23 RX ADMIN — HEPARIN SODIUM 5000 UNIT(S): 5000 INJECTION INTRAVENOUS; SUBCUTANEOUS at 19:51

## 2022-08-23 RX ADMIN — Medication 975 MILLIGRAM(S): at 11:23

## 2022-08-23 RX ADMIN — GABAPENTIN 100 MILLIGRAM(S): 400 CAPSULE ORAL at 11:24

## 2022-08-23 RX ADMIN — ATORVASTATIN CALCIUM 40 MILLIGRAM(S): 80 TABLET, FILM COATED ORAL at 21:18

## 2022-08-23 RX ADMIN — Medication 1000 MILLIGRAM(S): at 20:30

## 2022-08-23 RX ADMIN — Medication 1 TABLET(S): at 19:52

## 2022-08-23 RX ADMIN — Medication 3 MILLILITER(S): at 22:52

## 2022-08-23 RX ADMIN — Medication 400 MILLIGRAM(S): at 20:08

## 2022-08-23 NOTE — BRIEF OPERATIVE NOTE - COMMENTS
BRENDA Reese provided direct first assist support to the surgeon during this surgical procedure. My involvement included positioning, prepping and draping the patient prior to surgery,  robotic port placement, Robotic docking, robotic instrument insertion and removal, ensuring clear visibility and exposure for the surgeon by using instruments such as retractors, suction and sponges, retrieving specimens from the operative field, closing surgical incisions, undocking the robot, stapling tissues and vessels, and dressing wounds. As well as other tasks as directed by the surgeon.

## 2022-08-24 LAB
ANION GAP SERPL CALC-SCNC: 12 MMOL/L — SIGNIFICANT CHANGE UP (ref 7–14)
ANION GAP SERPL CALC-SCNC: 16 MMOL/L — HIGH (ref 7–14)
BASOPHILS # BLD AUTO: 0.03 K/UL — SIGNIFICANT CHANGE UP (ref 0–0.2)
BASOPHILS NFR BLD AUTO: 0.3 % — SIGNIFICANT CHANGE UP (ref 0–2)
BUN SERPL-MCNC: 15 MG/DL — SIGNIFICANT CHANGE UP (ref 7–23)
BUN SERPL-MCNC: 16 MG/DL — SIGNIFICANT CHANGE UP (ref 7–23)
CALCIUM SERPL-MCNC: 8.6 MG/DL — SIGNIFICANT CHANGE UP (ref 8.4–10.5)
CALCIUM SERPL-MCNC: 9.2 MG/DL — SIGNIFICANT CHANGE UP (ref 8.4–10.5)
CHLORIDE SERPL-SCNC: 103 MMOL/L — SIGNIFICANT CHANGE UP (ref 98–107)
CHLORIDE SERPL-SCNC: 104 MMOL/L — SIGNIFICANT CHANGE UP (ref 98–107)
CO2 SERPL-SCNC: 17 MMOL/L — LOW (ref 22–31)
CO2 SERPL-SCNC: 22 MMOL/L — SIGNIFICANT CHANGE UP (ref 22–31)
CREAT SERPL-MCNC: 0.84 MG/DL — SIGNIFICANT CHANGE UP (ref 0.5–1.3)
CREAT SERPL-MCNC: 0.84 MG/DL — SIGNIFICANT CHANGE UP (ref 0.5–1.3)
EGFR: 69 ML/MIN/1.73M2 — SIGNIFICANT CHANGE UP
EGFR: 69 ML/MIN/1.73M2 — SIGNIFICANT CHANGE UP
EOSINOPHIL # BLD AUTO: 0 K/UL — SIGNIFICANT CHANGE UP (ref 0–0.5)
EOSINOPHIL NFR BLD AUTO: 0 % — SIGNIFICANT CHANGE UP (ref 0–6)
GLUCOSE SERPL-MCNC: 130 MG/DL — HIGH (ref 70–99)
GLUCOSE SERPL-MCNC: 132 MG/DL — HIGH (ref 70–99)
HCT VFR BLD CALC: 45.6 % — HIGH (ref 34.5–45)
HGB BLD-MCNC: 14.9 G/DL — SIGNIFICANT CHANGE UP (ref 11.5–15.5)
IANC: 7.99 K/UL — HIGH (ref 1.8–7.4)
IMM GRANULOCYTES NFR BLD AUTO: 0.6 % — SIGNIFICANT CHANGE UP (ref 0–1.5)
LYMPHOCYTES # BLD AUTO: 1.53 K/UL — SIGNIFICANT CHANGE UP (ref 1–3.3)
LYMPHOCYTES # BLD AUTO: 14.9 % — SIGNIFICANT CHANGE UP (ref 13–44)
MAGNESIUM SERPL-MCNC: 1.8 MG/DL — SIGNIFICANT CHANGE UP (ref 1.6–2.6)
MAGNESIUM SERPL-MCNC: 2 MG/DL — SIGNIFICANT CHANGE UP (ref 1.6–2.6)
MCHC RBC-ENTMCNC: 28.9 PG — SIGNIFICANT CHANGE UP (ref 27–34)
MCHC RBC-ENTMCNC: 32.7 GM/DL — SIGNIFICANT CHANGE UP (ref 32–36)
MCV RBC AUTO: 88.5 FL — SIGNIFICANT CHANGE UP (ref 80–100)
MONOCYTES # BLD AUTO: 0.68 K/UL — SIGNIFICANT CHANGE UP (ref 0–0.9)
MONOCYTES NFR BLD AUTO: 6.6 % — SIGNIFICANT CHANGE UP (ref 2–14)
NEUTROPHILS # BLD AUTO: 7.99 K/UL — HIGH (ref 1.8–7.4)
NEUTROPHILS NFR BLD AUTO: 77.6 % — HIGH (ref 43–77)
NRBC # BLD: 0 /100 WBCS — SIGNIFICANT CHANGE UP (ref 0–0)
NRBC # FLD: 0 K/UL — SIGNIFICANT CHANGE UP (ref 0–0)
PHOSPHATE SERPL-MCNC: 3.5 MG/DL — SIGNIFICANT CHANGE UP (ref 2.5–4.5)
PHOSPHATE SERPL-MCNC: 4.1 MG/DL — SIGNIFICANT CHANGE UP (ref 2.5–4.5)
PLATELET # BLD AUTO: 183 K/UL — SIGNIFICANT CHANGE UP (ref 150–400)
POTASSIUM SERPL-MCNC: 3.9 MMOL/L — SIGNIFICANT CHANGE UP (ref 3.5–5.3)
POTASSIUM SERPL-MCNC: 5.1 MMOL/L — SIGNIFICANT CHANGE UP (ref 3.5–5.3)
POTASSIUM SERPL-SCNC: 3.9 MMOL/L — SIGNIFICANT CHANGE UP (ref 3.5–5.3)
POTASSIUM SERPL-SCNC: 5.1 MMOL/L — SIGNIFICANT CHANGE UP (ref 3.5–5.3)
RBC # BLD: 5.15 M/UL — SIGNIFICANT CHANGE UP (ref 3.8–5.2)
RBC # FLD: 13.4 % — SIGNIFICANT CHANGE UP (ref 10.3–14.5)
SODIUM SERPL-SCNC: 136 MMOL/L — SIGNIFICANT CHANGE UP (ref 135–145)
SODIUM SERPL-SCNC: 138 MMOL/L — SIGNIFICANT CHANGE UP (ref 135–145)
WBC # BLD: 10.29 K/UL — SIGNIFICANT CHANGE UP (ref 3.8–10.5)
WBC # FLD AUTO: 10.29 K/UL — SIGNIFICANT CHANGE UP (ref 3.8–10.5)

## 2022-08-24 PROCEDURE — 99233 SBSQ HOSP IP/OBS HIGH 50: CPT

## 2022-08-24 PROCEDURE — 71045 X-RAY EXAM CHEST 1 VIEW: CPT | Mod: 26

## 2022-08-24 RX ORDER — LIDOCAINE 4 G/100G
1 CREAM TOPICAL EVERY 24 HOURS
Refills: 0 | Status: DISCONTINUED | OUTPATIENT
Start: 2022-08-24 | End: 2022-08-28

## 2022-08-24 RX ORDER — GABAPENTIN 400 MG/1
100 CAPSULE ORAL EVERY 8 HOURS
Refills: 0 | Status: DISCONTINUED | OUTPATIENT
Start: 2022-08-24 | End: 2022-08-28

## 2022-08-24 RX ORDER — OXYCODONE HYDROCHLORIDE 5 MG/1
2.5 TABLET ORAL ONCE
Refills: 0 | Status: DISCONTINUED | OUTPATIENT
Start: 2022-08-24 | End: 2022-08-24

## 2022-08-24 RX ORDER — OXYCODONE HYDROCHLORIDE 5 MG/1
5 TABLET ORAL
Refills: 0 | Status: DISCONTINUED | OUTPATIENT
Start: 2022-08-24 | End: 2022-08-24

## 2022-08-24 RX ORDER — ACETAMINOPHEN 500 MG
650 TABLET ORAL EVERY 6 HOURS
Refills: 0 | Status: COMPLETED | OUTPATIENT
Start: 2022-08-24 | End: 2022-08-26

## 2022-08-24 RX ORDER — OXYCODONE HYDROCHLORIDE 5 MG/1
10 TABLET ORAL EVERY 4 HOURS
Refills: 0 | Status: DISCONTINUED | OUTPATIENT
Start: 2022-08-24 | End: 2022-08-28

## 2022-08-24 RX ORDER — OXYCODONE HYDROCHLORIDE 5 MG/1
2.5 TABLET ORAL
Refills: 0 | Status: DISCONTINUED | OUTPATIENT
Start: 2022-08-24 | End: 2022-08-24

## 2022-08-24 RX ORDER — OXYCODONE HYDROCHLORIDE 5 MG/1
5 TABLET ORAL EVERY 4 HOURS
Refills: 0 | Status: DISCONTINUED | OUTPATIENT
Start: 2022-08-24 | End: 2022-08-28

## 2022-08-24 RX ADMIN — OXYCODONE HYDROCHLORIDE 10 MILLIGRAM(S): 5 TABLET ORAL at 22:02

## 2022-08-24 RX ADMIN — OXYCODONE HYDROCHLORIDE 2.5 MILLIGRAM(S): 5 TABLET ORAL at 11:50

## 2022-08-24 RX ADMIN — Medication 3 MILLILITER(S): at 04:36

## 2022-08-24 RX ADMIN — Medication 1000 MILLIGRAM(S): at 05:00

## 2022-08-24 RX ADMIN — HEPARIN SODIUM 5000 UNIT(S): 5000 INJECTION INTRAVENOUS; SUBCUTANEOUS at 22:02

## 2022-08-24 RX ADMIN — SENNA PLUS 2 TABLET(S): 8.6 TABLET ORAL at 22:02

## 2022-08-24 RX ADMIN — OXYCODONE HYDROCHLORIDE 5 MILLIGRAM(S): 5 TABLET ORAL at 16:47

## 2022-08-24 RX ADMIN — LIDOCAINE 1 PATCH: 4 CREAM TOPICAL at 08:00

## 2022-08-24 RX ADMIN — Medication 400 MILLIGRAM(S): at 04:30

## 2022-08-24 RX ADMIN — Medication 1 TABLET(S): at 11:44

## 2022-08-24 RX ADMIN — OXYCODONE HYDROCHLORIDE 2.5 MILLIGRAM(S): 5 TABLET ORAL at 10:35

## 2022-08-24 RX ADMIN — Medication 650 MILLIGRAM(S): at 17:49

## 2022-08-24 RX ADMIN — HEPARIN SODIUM 5000 UNIT(S): 5000 INJECTION INTRAVENOUS; SUBCUTANEOUS at 04:31

## 2022-08-24 RX ADMIN — OXYCODONE HYDROCHLORIDE 10 MILLIGRAM(S): 5 TABLET ORAL at 22:43

## 2022-08-24 RX ADMIN — Medication 3 MILLILITER(S): at 17:12

## 2022-08-24 RX ADMIN — OXYCODONE HYDROCHLORIDE 5 MILLIGRAM(S): 5 TABLET ORAL at 17:30

## 2022-08-24 RX ADMIN — Medication 650 MILLIGRAM(S): at 11:44

## 2022-08-24 RX ADMIN — Medication 3 MILLILITER(S): at 20:36

## 2022-08-24 RX ADMIN — LIDOCAINE 1 PATCH: 4 CREAM TOPICAL at 16:48

## 2022-08-24 RX ADMIN — HEPARIN SODIUM 5000 UNIT(S): 5000 INJECTION INTRAVENOUS; SUBCUTANEOUS at 11:47

## 2022-08-24 RX ADMIN — Medication 3 MILLILITER(S): at 09:12

## 2022-08-24 RX ADMIN — OXYCODONE HYDROCHLORIDE 2.5 MILLIGRAM(S): 5 TABLET ORAL at 12:50

## 2022-08-24 RX ADMIN — OXYCODONE HYDROCHLORIDE 2.5 MILLIGRAM(S): 5 TABLET ORAL at 10:00

## 2022-08-24 NOTE — PHYSICAL THERAPY INITIAL EVALUATION ADULT - ADDITIONAL COMMENTS
Pt. was left in stretcher post PT Evaluation, no apparent distress, all lines intact, transport team present. RN aware.

## 2022-08-24 NOTE — PHYSICAL THERAPY INITIAL EVALUATION ADULT - GENERAL OBSERVATIONS, REHAB EVAL
Consult received, chart reviewed. Patient received ambulating in room, RN present, no apparent distress, +left chest tube.

## 2022-08-24 NOTE — PHYSICAL THERAPY INITIAL EVALUATION ADULT - WEIGHT-BEARING RESTRICTIONS: GAIT, REHAB EVAL
Problem: SLP Goal  Goal: SLP Goal  Speech Language Pathology Goals  Goals expected to be met by 6/8    1. Pt will tolerate PMSV for 5 min with >92% spO2 to increase phonation, respiration and swallowing aspects  2. Pt/family will don/doff PMSV x1 during session with min cues  3. Pt will vocalize with  PMSV in place to increase verbal expression.         Pt s/p trach downsizing with increased tolerance of PMSV. See note for details.   Jerilyn Smith, HODAN-SLP  6/4/2019         weight-bearing as tolerated inadequate protein energy intakes

## 2022-08-25 LAB
ANION GAP SERPL CALC-SCNC: 11 MMOL/L — SIGNIFICANT CHANGE UP (ref 7–14)
BUN SERPL-MCNC: 13 MG/DL — SIGNIFICANT CHANGE UP (ref 7–23)
CALCIUM SERPL-MCNC: 9.1 MG/DL — SIGNIFICANT CHANGE UP (ref 8.4–10.5)
CHLORIDE SERPL-SCNC: 105 MMOL/L — SIGNIFICANT CHANGE UP (ref 98–107)
CO2 SERPL-SCNC: 24 MMOL/L — SIGNIFICANT CHANGE UP (ref 22–31)
CREAT SERPL-MCNC: 0.92 MG/DL — SIGNIFICANT CHANGE UP (ref 0.5–1.3)
EGFR: 62 ML/MIN/1.73M2 — SIGNIFICANT CHANGE UP
GLUCOSE SERPL-MCNC: 86 MG/DL — SIGNIFICANT CHANGE UP (ref 70–99)
HCT VFR BLD CALC: 41.2 % — SIGNIFICANT CHANGE UP (ref 34.5–45)
HGB BLD-MCNC: 13.4 G/DL — SIGNIFICANT CHANGE UP (ref 11.5–15.5)
MAGNESIUM SERPL-MCNC: 1.9 MG/DL — SIGNIFICANT CHANGE UP (ref 1.6–2.6)
MCHC RBC-ENTMCNC: 28.5 PG — SIGNIFICANT CHANGE UP (ref 27–34)
MCHC RBC-ENTMCNC: 32.5 GM/DL — SIGNIFICANT CHANGE UP (ref 32–36)
MCV RBC AUTO: 87.5 FL — SIGNIFICANT CHANGE UP (ref 80–100)
NRBC # BLD: 0 /100 WBCS — SIGNIFICANT CHANGE UP (ref 0–0)
NRBC # FLD: 0 K/UL — SIGNIFICANT CHANGE UP (ref 0–0)
PHOSPHATE SERPL-MCNC: 2.1 MG/DL — LOW (ref 2.5–4.5)
PLATELET # BLD AUTO: 192 K/UL — SIGNIFICANT CHANGE UP (ref 150–400)
POTASSIUM SERPL-MCNC: 4.8 MMOL/L — SIGNIFICANT CHANGE UP (ref 3.5–5.3)
POTASSIUM SERPL-SCNC: 4.8 MMOL/L — SIGNIFICANT CHANGE UP (ref 3.5–5.3)
RBC # BLD: 4.71 M/UL — SIGNIFICANT CHANGE UP (ref 3.8–5.2)
RBC # FLD: 13.9 % — SIGNIFICANT CHANGE UP (ref 10.3–14.5)
SODIUM SERPL-SCNC: 140 MMOL/L — SIGNIFICANT CHANGE UP (ref 135–145)
WBC # BLD: 7.93 K/UL — SIGNIFICANT CHANGE UP (ref 3.8–10.5)
WBC # FLD AUTO: 7.93 K/UL — SIGNIFICANT CHANGE UP (ref 3.8–10.5)

## 2022-08-25 PROCEDURE — 71045 X-RAY EXAM CHEST 1 VIEW: CPT | Mod: 26

## 2022-08-25 PROCEDURE — 71250 CT THORAX DX C-: CPT | Mod: 26

## 2022-08-25 RX ADMIN — LIDOCAINE 1 PATCH: 4 CREAM TOPICAL at 04:48

## 2022-08-25 RX ADMIN — ATORVASTATIN CALCIUM 40 MILLIGRAM(S): 80 TABLET, FILM COATED ORAL at 23:15

## 2022-08-25 RX ADMIN — HEPARIN SODIUM 5000 UNIT(S): 5000 INJECTION INTRAVENOUS; SUBCUTANEOUS at 06:08

## 2022-08-25 RX ADMIN — Medication 3 MILLILITER(S): at 09:42

## 2022-08-25 RX ADMIN — GABAPENTIN 100 MILLIGRAM(S): 400 CAPSULE ORAL at 14:38

## 2022-08-25 RX ADMIN — OXYCODONE HYDROCHLORIDE 10 MILLIGRAM(S): 5 TABLET ORAL at 06:48

## 2022-08-25 RX ADMIN — OXYCODONE HYDROCHLORIDE 10 MILLIGRAM(S): 5 TABLET ORAL at 06:07

## 2022-08-25 RX ADMIN — Medication 1 TABLET(S): at 12:01

## 2022-08-25 RX ADMIN — HEPARIN SODIUM 5000 UNIT(S): 5000 INJECTION INTRAVENOUS; SUBCUTANEOUS at 23:15

## 2022-08-25 RX ADMIN — Medication 650 MILLIGRAM(S): at 12:01

## 2022-08-25 RX ADMIN — GABAPENTIN 100 MILLIGRAM(S): 400 CAPSULE ORAL at 23:15

## 2022-08-25 RX ADMIN — HEPARIN SODIUM 5000 UNIT(S): 5000 INJECTION INTRAVENOUS; SUBCUTANEOUS at 14:30

## 2022-08-25 RX ADMIN — Medication 650 MILLIGRAM(S): at 18:16

## 2022-08-25 RX ADMIN — Medication 650 MILLIGRAM(S): at 23:15

## 2022-08-25 RX ADMIN — Medication 3 MILLILITER(S): at 15:55

## 2022-08-25 RX ADMIN — Medication 650 MILLIGRAM(S): at 18:26

## 2022-08-25 RX ADMIN — Medication 650 MILLIGRAM(S): at 06:07

## 2022-08-25 RX ADMIN — Medication 650 MILLIGRAM(S): at 06:37

## 2022-08-25 RX ADMIN — Medication 3 MILLILITER(S): at 20:46

## 2022-08-25 RX ADMIN — SENNA PLUS 2 TABLET(S): 8.6 TABLET ORAL at 23:15

## 2022-08-26 PROCEDURE — 71045 X-RAY EXAM CHEST 1 VIEW: CPT | Mod: 26

## 2022-08-26 RX ORDER — POLYETHYLENE GLYCOL 3350 17 G/17G
17 POWDER, FOR SOLUTION ORAL DAILY
Refills: 0 | Status: DISCONTINUED | OUTPATIENT
Start: 2022-08-26 | End: 2022-08-28

## 2022-08-26 RX ADMIN — GABAPENTIN 100 MILLIGRAM(S): 400 CAPSULE ORAL at 13:05

## 2022-08-26 RX ADMIN — Medication 3 MILLILITER(S): at 04:09

## 2022-08-26 RX ADMIN — HEPARIN SODIUM 5000 UNIT(S): 5000 INJECTION INTRAVENOUS; SUBCUTANEOUS at 05:40

## 2022-08-26 RX ADMIN — SENNA PLUS 2 TABLET(S): 8.6 TABLET ORAL at 21:28

## 2022-08-26 RX ADMIN — ATORVASTATIN CALCIUM 40 MILLIGRAM(S): 80 TABLET, FILM COATED ORAL at 21:28

## 2022-08-26 RX ADMIN — GABAPENTIN 100 MILLIGRAM(S): 400 CAPSULE ORAL at 05:40

## 2022-08-26 RX ADMIN — Medication 650 MILLIGRAM(S): at 00:15

## 2022-08-26 RX ADMIN — HEPARIN SODIUM 5000 UNIT(S): 5000 INJECTION INTRAVENOUS; SUBCUTANEOUS at 13:03

## 2022-08-26 RX ADMIN — POLYETHYLENE GLYCOL 3350 17 GRAM(S): 17 POWDER, FOR SOLUTION ORAL at 16:43

## 2022-08-26 RX ADMIN — Medication 1 TABLET(S): at 13:07

## 2022-08-26 RX ADMIN — Medication 3 MILLILITER(S): at 16:18

## 2022-08-26 RX ADMIN — Medication 3 MILLILITER(S): at 10:02

## 2022-08-26 RX ADMIN — Medication 3 MILLILITER(S): at 22:14

## 2022-08-26 RX ADMIN — Medication 650 MILLIGRAM(S): at 05:39

## 2022-08-26 NOTE — PROGRESS NOTE ADULT - ASSESSMENT
Assessment: 81 year old female PMH severe tricuspid regurgitation, pulmonary htn, thoracic aorta dilation presents to [facility] for planned thoracic surgery for abnormal finding of lung field, now s/p FB, Robo LVATS, Lingulectomy, MLND on 8/23/2022.    PLAN  Neuro: Pain management  Pulm: Encourage coughing, deep breathing and use of incentive spirometry. Daily CXR.   Cardio: Monitor telemetry/alarms  GI: Tolerating diet. Continue stool softeners.  Renal: monitor urine output, supplement electrolytes as needed  Vasc: Heparin SC/SCDs for DVT prophylaxis  Heme: Stable H/H. .   ID: Off antibiotics. Stable.  Therapy: OOB/ambulate  Tubes: L CT switched from suxn to water seal. Monitor Chest tube output  Disposition: Aim to D/C to home once stable and chest tube removed    Thoracic Surgery  [id number]

## 2022-08-27 ENCOUNTER — TRANSCRIPTION ENCOUNTER (OUTPATIENT)
Age: 82
End: 2022-08-27

## 2022-08-27 LAB
ANION GAP SERPL CALC-SCNC: 12 MMOL/L — SIGNIFICANT CHANGE UP (ref 7–14)
BUN SERPL-MCNC: 16 MG/DL — SIGNIFICANT CHANGE UP (ref 7–23)
CALCIUM SERPL-MCNC: 9.2 MG/DL — SIGNIFICANT CHANGE UP (ref 8.4–10.5)
CHLORIDE SERPL-SCNC: 105 MMOL/L — SIGNIFICANT CHANGE UP (ref 98–107)
CO2 SERPL-SCNC: 24 MMOL/L — SIGNIFICANT CHANGE UP (ref 22–31)
CREAT SERPL-MCNC: 0.79 MG/DL — SIGNIFICANT CHANGE UP (ref 0.5–1.3)
EGFR: 75 ML/MIN/1.73M2 — SIGNIFICANT CHANGE UP
GLUCOSE SERPL-MCNC: 88 MG/DL — SIGNIFICANT CHANGE UP (ref 70–99)
HCT VFR BLD CALC: 42.6 % — SIGNIFICANT CHANGE UP (ref 34.5–45)
HGB BLD-MCNC: 13.6 G/DL — SIGNIFICANT CHANGE UP (ref 11.5–15.5)
MAGNESIUM SERPL-MCNC: 1.9 MG/DL — SIGNIFICANT CHANGE UP (ref 1.6–2.6)
MCHC RBC-ENTMCNC: 29.1 PG — SIGNIFICANT CHANGE UP (ref 27–34)
MCHC RBC-ENTMCNC: 31.9 GM/DL — LOW (ref 32–36)
MCV RBC AUTO: 91.2 FL — SIGNIFICANT CHANGE UP (ref 80–100)
NRBC # BLD: 0 /100 WBCS — SIGNIFICANT CHANGE UP (ref 0–0)
NRBC # FLD: 0 K/UL — SIGNIFICANT CHANGE UP (ref 0–0)
PHOSPHATE SERPL-MCNC: 4 MG/DL — SIGNIFICANT CHANGE UP (ref 2.5–4.5)
PLATELET # BLD AUTO: 213 K/UL — SIGNIFICANT CHANGE UP (ref 150–400)
POTASSIUM SERPL-MCNC: 4.3 MMOL/L — SIGNIFICANT CHANGE UP (ref 3.5–5.3)
POTASSIUM SERPL-SCNC: 4.3 MMOL/L — SIGNIFICANT CHANGE UP (ref 3.5–5.3)
RBC # BLD: 4.67 M/UL — SIGNIFICANT CHANGE UP (ref 3.8–5.2)
RBC # FLD: 13.6 % — SIGNIFICANT CHANGE UP (ref 10.3–14.5)
SODIUM SERPL-SCNC: 141 MMOL/L — SIGNIFICANT CHANGE UP (ref 135–145)
WBC # BLD: 5.53 K/UL — SIGNIFICANT CHANGE UP (ref 3.8–10.5)
WBC # FLD AUTO: 5.53 K/UL — SIGNIFICANT CHANGE UP (ref 3.8–10.5)

## 2022-08-27 PROCEDURE — 71045 X-RAY EXAM CHEST 1 VIEW: CPT | Mod: 26

## 2022-08-27 RX ORDER — ACETAMINOPHEN 500 MG
1000 TABLET ORAL EVERY 6 HOURS
Refills: 0 | Status: DISCONTINUED | OUTPATIENT
Start: 2022-08-27 | End: 2022-08-28

## 2022-08-27 RX ADMIN — HEPARIN SODIUM 5000 UNIT(S): 5000 INJECTION INTRAVENOUS; SUBCUTANEOUS at 12:09

## 2022-08-27 RX ADMIN — GABAPENTIN 100 MILLIGRAM(S): 400 CAPSULE ORAL at 12:08

## 2022-08-27 RX ADMIN — Medication 3 MILLILITER(S): at 16:18

## 2022-08-27 RX ADMIN — Medication 3 MILLILITER(S): at 21:36

## 2022-08-27 RX ADMIN — GABAPENTIN 100 MILLIGRAM(S): 400 CAPSULE ORAL at 21:13

## 2022-08-27 RX ADMIN — Medication 1000 MILLIGRAM(S): at 12:40

## 2022-08-27 RX ADMIN — Medication 1 TABLET(S): at 11:43

## 2022-08-27 RX ADMIN — Medication 3 MILLILITER(S): at 10:40

## 2022-08-27 RX ADMIN — Medication 1000 MILLIGRAM(S): at 12:08

## 2022-08-27 RX ADMIN — HEPARIN SODIUM 5000 UNIT(S): 5000 INJECTION INTRAVENOUS; SUBCUTANEOUS at 05:30

## 2022-08-27 RX ADMIN — ATORVASTATIN CALCIUM 40 MILLIGRAM(S): 80 TABLET, FILM COATED ORAL at 21:13

## 2022-08-27 RX ADMIN — GABAPENTIN 100 MILLIGRAM(S): 400 CAPSULE ORAL at 05:31

## 2022-08-27 NOTE — DISCHARGE NOTE PROVIDER - CARE PROVIDER_API CALL
Nando Baker)  Thoracic Surgery  [phone] [place] [address]  Phone: [phone]  Fax: [phone]  Follow Up Time:

## 2022-08-27 NOTE — DISCHARGE NOTE PROVIDER - NSDCCPTREATMENT_GEN_ALL_CORE_FT
PRINCIPAL PROCEDURE  Procedure: VATS, with flexible bronchoscopy  Findings and Treatment: LVATS, Lingulectomy, MLND

## 2022-08-27 NOTE — DISCHARGE NOTE PROVIDER - HOSPITAL COURSE
HPI:  81 year old female PMH severe tricuspid regurgitation, pulmonary htn, thoracic aorta dilation presents to presurgical testing with diagnosis of other nonspecific abnormal finding of lung field scheduled for robotic assisted left video assisted thoracoscopy and left upper lobe wedge resection possible lingulectomy. Pt with increasing left upper lung nodule.  (09 Aug 2022 11:49)  On 8/23/2022 pt underwent LVATS, Lingulectomy, MLND. She tolerated procedure well, had a FEAL for a few days. Tolerated an overnight clamp trial and was discharged home when chest tube removed

## 2022-08-27 NOTE — PROGRESS NOTE ADULT - SUBJECTIVE AND OBJECTIVE BOX
Anesthesia Pain Management Service    SUBJECTIVE: Pt never started on IV PCA,  without problems reported.  Patient states she feels her pain is controlled with Tylenol.  Pain Score:  5/10 at rest, 7/10 with activity    Therapy:	  [ ] IV PCA	   [ ] Epidural           [ ] s/p Spinal Opoid              [ ] Postpartum infusion	  [ ] Patient controlled regional anesthesia (PCRA)    [x ] prn Analgesics    Allergies  contrast media (iodine-based) (Other)  penicillin (Hives; Fever)    MEDICATIONS  (STANDING):  acetaminophen     Tablet .. 650 milliGRAM(s) Oral every 6 hours  albuterol/ipratropium for Nebulization 3 milliLiter(s) Nebulizer every 6 hours  atorvastatin 40 milliGRAM(s) Oral at bedtime  heparin   Injectable 5000 Unit(s) SubCutaneous every 8 hours  lidocaine   4% Patch 1 Patch Transdermal every 24 hours  multivitamin 1 Tablet(s) Oral daily  senna 2 Tablet(s) Oral at bedtime    MEDICATIONS  (PRN):  naloxone Injectable 0.1 milliGRAM(s) IV Push every 3 minutes PRN For ANY of the following changes in patient status:  A. RR LESS THAN 10 breaths per minute, B. Oxygen saturation LESS THAN 90%, C. Sedation score of 6  ondansetron Injectable 4 milliGRAM(s) IV Push every 6 hours PRN Nausea  oxyCODONE    IR 2.5 milliGRAM(s) Oral every 3 hours PRN Moderate Pain (4 - 6)  oxyCODONE    IR 5 milliGRAM(s) Oral every 3 hours PRN Severe Pain (7 - 10)      OBJECTIVE:   [X] No new signs     [ ] Other:    Side Effects:  [X ] None			[ ] Other:    Assessment of Catheter Site:		[ ] Intact		[ ] Other:    ASSESSMENT/PLAN  [ ] Continue current therapy    [X ] Therapy changed to:    [ ] IV PCA       [ ] Epidural     [ X] prn Analgesics     Comments: IV PCA never started, patient's pain is controlled with Tylenol.  Ordered PRN Oral/IV opioids and/or non-opioid adjuvant analgesics to be used at this point.    Progress Note written now but Patient was seen earlier.
DUSTIN NEIL                     [id number]    HPI:  81 year old female PMH severe tricuspid regurgitation, pulmonary htn, thoracic aorta dilation presents to presurgical testing with diagnosis of other nonspecific abnormal finding of lung field scheduled for robotic assisted left video assisted thoracoscopy and left upper lobe wedge resection possible lingulectomy. Pt with increasing left upper lung nodule.  (09 Aug 2022 11:49)      Procedure: FB, Robo LVATS, Lingulectomy, MLND 8/23/2022                       Issues:              Lung nodule              Postop pain              Chest tube in place  Mild depression  History of COPD, does not use inhalers  Tricuspid regurgitation-severe  Dilation of thoracic aorta  Osteoporosis  Calcification of coronary artery  HLD (hyperlipidemia)  Hard of hearing  History of colon resection, for intestinal obstruction 2004    PAST MEDICAL & SURGICAL HISTORY:  Other nonspecific abnormal finding of lung field      H/O pulmonary hypertension      H/O headache      Mild depression      History of COPD  does not use inhalers      Pulmonary nodules      Tricuspid regurgitation  severe      Dilation of thoracic aorta      Osteoporosis      Calcification of coronary artery      HLD (hyperlipidemia)      Hard of hearing      History of appendectomy      History of colon resection  for intenstinal obstruction 2004      Status post open reduction and internal fixation (ORIF) of fracture  right wrist      S/P ear surgery  several      History of cataract surgery  b/l                VITAL SIGNS:  Vital Signs Last 24 Hrs  T(C): 36.7 (24 Aug 2022 08:00), Max: 36.7 (24 Aug 2022 08:00)  T(F): 98 (24 Aug 2022 08:00), Max: 98 (24 Aug 2022 08:00)  HR: 79 (24 Aug 2022 10:35) (70 - 92)  BP: 118/66 (24 Aug 2022 10:35) (110/83 - 160/83)  BP(mean): 82 (24 Aug 2022 10:35) (82 - 119)  RR: 17 (24 Aug 2022 10:35) (12 - 24)  SpO2: 96% (24 Aug 2022 10:35) (92% - 100%)    Parameters below as of 24 Aug 2022 10:35  Patient On (Oxygen Delivery Method): room air        I/Os:   I&O's Detail    23 Aug 2022 07:01  -  24 Aug 2022 07:00  --------------------------------------------------------  IN:    IV PiggyBack: 100 mL    Lactated Ringers: 510 mL  Total IN: 610 mL    OUT:    Chest Tube (mL): 40 mL    Voided (mL): 500 mL  Total OUT: 540 mL    Total NET: 70 mL      24 Aug 2022 07:01  -  24 Aug 2022 11:22  --------------------------------------------------------  IN:    Oral Fluid: 240 mL  Total IN: 240 mL    OUT:    Chest Tube (mL): 0 mL    Voided (mL): 200 mL  Total OUT: 200 mL    Total NET: 40 mL          CAPILLARY BLOOD GLUCOSE          =======================MEDICATIONS===================  MEDICATIONS  (STANDING):  acetaminophen     Tablet .. 650 milliGRAM(s) Oral every 6 hours  albuterol/ipratropium for Nebulization 3 milliLiter(s) Nebulizer every 6 hours  atorvastatin 40 milliGRAM(s) Oral at bedtime  heparin   Injectable 5000 Unit(s) SubCutaneous every 8 hours  lidocaine   4% Patch 1 Patch Transdermal every 24 hours  multivitamin 1 Tablet(s) Oral daily  senna 2 Tablet(s) Oral at bedtime    MEDICATIONS  (PRN):  naloxone Injectable 0.1 milliGRAM(s) IV Push every 3 minutes PRN For ANY of the following changes in patient status:  A. RR LESS THAN 10 breaths per minute, B. Oxygen saturation LESS THAN 90%, C. Sedation score of 6  ondansetron Injectable 4 milliGRAM(s) IV Push every 6 hours PRN Nausea  oxyCODONE    IR 2.5 milliGRAM(s) Oral every 3 hours PRN Moderate Pain (4 - 6)  oxyCODONE    IR 5 milliGRAM(s) Oral every 3 hours PRN Severe Pain (7 - 10)      PHYSICAL EXAM============================  General:                         Awake, alert, not in any distress  Neuro:                            Moving all extremities to commands.   Respiratory:	Air entry fair and  bilateral conducted sounds                                           Effort even and unlabored.  CV:		Regular rate and rhythm. Normal S1/S2                                          Distal pulses present.  Abdomen:	                     Soft, non-distended. Bowel sounds present   Skin:		No rash.  Extremities:	Warm, no cyanosis or edema.  Palpable pulses    ============================LABS=========================                        14.9   10.29 )-----------( 183      ( 24 Aug 2022 04:40 )             45.6     08-24    138  |  104  |  15  ----------------------------<  130<H>  3.9   |  22  |  0.84    Ca    8.6      24 Aug 2022 07:01  Phos  3.5     08-24  Mg     1.80     08-24      A/P;  81yFemale s/p FB, Robo LVATS, Lingulectomy, MLND 8/23/2022, experiencing  pain with deep breathing.                             Neuro:                                         Pain control with PCA /  Tylenol PRN                            Cardiovascular:                                          Telemetry (medical test) - Reviewed by me today independently. Normal sinus rhythm with some PVCs / A-fib.                                          Continue hemodynamic monitoring to prevent decompensation.    HLD: On Zocar                            Respiratory:                                         Postop hypoxemia requiring O2 via nasal cannula probably due to postop pain - Wean nasal cannula for goal O2sat above 92%.                                              Obtain CXR . Encourage incentive spirometry.                                                   Chest PT and frequent suctioning. Continue bronchodilators, Pulmozyme and inhaled 3% saline inhalations.                                                      OOB to chair & ambulate w/ assistance.                                                           Continuous pulse oximetry for support & to prevent decompensation.                                         Monitor chest tube output                                         Chest tube to suction                                                                                      GI                                         On  DASH  diet as tolerated                                         Continue Zofran / Reglan for nausea - PRN                                         Continue bowel regimen	                                                                 Renal:                                         Continue LR  30cc/hr                                         Monitor I/Os and electrolytes                                                                                        Hem/ Onc:                                         DVT prophylaxis with SQ Heparin and SCDs                                         Monitor chest tube output &  signs of bleeding.                                          Follow CBC in AM                           Infectious disease:                                            Monitor for fever / leukocytosis.                                          All surgical incision / chest tube  sites look clean                            Endocrine                                             Continue Accu-Checks with coverage                                                  Pertinent clinical, laboratory, radiographic, hemodynamic, echocardiographic, respiratory data, microbiologic data and chart were reviewed and analyzed frequently throughout the course of the day and night.     Patient seen, examined and plan discussed with CT Surgeon Dr. Bkaer / CTICU team during rounds.    OOB to chair and ambulate as tolerated.     Status discussed with patient and updated plan of care.     I have spent 35 minutes with this patient including 20 minutes of time coordinating care in the ICU..          Susan CHOIP    
Anesthesia Pain Management Service- Attending Addendum    SUBJECTIVE: Patient's pain control adequate    Therapy:	  [ X] IV PCA	   [ ] Epidural           [ ] s/p Spinal Opoid              [ ] Postpartum infusion	  [ ] Patient controlled regional anesthesia (PCRA)    [ ] prn Analgesics    Allergies    contrast media (iodine-based) (Other)  penicillin (Hives; Fever)    Intolerances      MEDICATIONS  (STANDING):  acetaminophen     Tablet .. 650 milliGRAM(s) Oral every 6 hours  albuterol/ipratropium for Nebulization 3 milliLiter(s) Nebulizer every 6 hours  atorvastatin 40 milliGRAM(s) Oral at bedtime  heparin   Injectable 5000 Unit(s) SubCutaneous every 8 hours  lidocaine   4% Patch 1 Patch Transdermal every 24 hours  multivitamin 1 Tablet(s) Oral daily  senna 2 Tablet(s) Oral at bedtime    MEDICATIONS  (PRN):  naloxone Injectable 0.1 milliGRAM(s) IV Push every 3 minutes PRN For ANY of the following changes in patient status:  A. RR LESS THAN 10 breaths per minute, B. Oxygen saturation LESS THAN 90%, C. Sedation score of 6  ondansetron Injectable 4 milliGRAM(s) IV Push every 6 hours PRN Nausea  oxyCODONE    IR 2.5 milliGRAM(s) Oral every 3 hours PRN Moderate Pain (4 - 6)  oxyCODONE    IR 5 milliGRAM(s) Oral every 3 hours PRN Severe Pain (7 - 10)      OBJECTIVE:   [X] No new signs     [ ] Other:    Side Effects:  [X ] None			[ ] Other:      ASSESSMENT/PLAN  -Discontinue current therapy    [ ] Therapy changed to:    [ ] IV PCA       [ ] Epidural     [ X] prn Analgesics     Comments: Pain management per primary team, APS to sign off    Note entered after patient seen
DUSTIN NEIL      81y   Female   [id number]         contrast media (iodine-based) (Other)  penicillin (Hives; Fever)             Daily Height in cm: 165.1 (23 Aug 2022 10:26)    Daily Drug Dosing Weight  Height (cm): 165.1 (23 Aug 2022 10:26)  Weight (kg): 78 (23 Aug 2022 10:26)  BMI (kg/m2): 28.6 (23 Aug 2022 10:26)  BSA (m2): 1.86 (23 Aug 2022 10:26)    HPI:  81 year old female PMH severe tricuspid regurgitation, pulmonary htn, thoracic aorta dilation presents to presurgical testing with diagnosis of other nonspecific abnormal finding of lung field scheduled for robotic assisted left video assisted thoracoscopy and left upper lobe wedge resection possible lingulectomy. Pt with increasing left upper lung nodule.  (09 Aug 2022 11:49)    Procedure: FB, Robo LVATS, Lingulectomy, MLND 8/23/2022                       Issues:              Lung nodule              Postop pain              Chest tube in place  Mild depression  History of COPD, does not use inhalers  Tricuspid regurgitation-severe  Dilation of thoracic aorta  Osteoporosis  Calcification of coronary artery  HLD (hyperlipidemia)  Hard of hearing  History of colon resection, for intestinal obstruction 2004        Postop course:     Patient reports moderate pain at chest wall incision sites which is worse with coughing and deep breathing without associated fever or dyspnea. Pain is improved with use of PCA and  oral pain meds.         Home Medications:  aspirin 325 mg oral tablet: 1 tab(s) orally every 4 hours, As Needed  last dose on 8/15/22 (23 Aug 2022 11:01)  biotin 5000 mcg oral capsule: orally once a day-last dose 08/17/22 (23 Aug 2022 11:01)  Centrum Silver Women&#x27;s oral tablet: 1 tab(s) orally once a day-last dose 08/17/22 (23 Aug 2022 11:01)  Prolia 60 mg/mL subcutaneous solution: subcutaneous every 6 months (23 Aug 2022 11:01)  simvastatin 20 mg oral tablet: 1 tab(s) orally once a day (23 Aug 2022 11:01)  Vitamin D3 25 mcg (1000 intl units) oral tablet: 1 tab(s) orally once a day (23 Aug 2022 11:01)    PAST MEDICAL & SURGICAL HISTORY:  Other nonspecific abnormal finding of lung field      H/O pulmonary hypertension      H/O headache      Mild depression      History of COPD  does not use inhalers      Pulmonary nodules      Tricuspid regurgitation  severe      Dilation of thoracic aorta      Osteoporosis      Calcification of coronary artery      HLD (hyperlipidemia)      Hard of hearing      History of appendectomy      History of colon resection  for intenstinal obstruction 2004      Status post open reduction and internal fixation (ORIF) of fracture  right wrist      S/P ear surgery  several      History of cataract surgery  b/l        Vital Signs Last 24 Hrs  T(C): 36.6 (23 Aug 2022 16:00), Max: 37.1 (23 Aug 2022 10:26)  T(F): 97.9 (23 Aug 2022 16:00), Max: 98.8 (23 Aug 2022 10:26)  HR: 80 (23 Aug 2022 16:45) (72 - 92)  BP: 133/79 (23 Aug 2022 16:15) (118/72 - 145/86)  BP(mean): 95 (23 Aug 2022 16:15) (95 - 107)  RR: 12 (23 Aug 2022 16:45) (12 - 24)  SpO2: 94% (23 Aug 2022 16:45) (92% - 97%)    Parameters below as of 23 Aug 2022 16:45  Patient On (Oxygen Delivery Method): nasal cannula w/ humidification  O2 Flow (L/min): 3    I&O's Detail    CAPILLARY BLOOD GLUCOSE        Home Medications:  aspirin 325 mg oral tablet: 1 tab(s) orally every 4 hours, As Needed  last dose on 8/15/22 (23 Aug 2022 11:01)  biotin 5000 mcg oral capsule: orally once a day-last dose 08/17/22 (23 Aug 2022 11:01)  Centrum Silver Women&#x27;s oral tablet: 1 tab(s) orally once a day-last dose 08/17/22 (23 Aug 2022 11:01)  Prolia 60 mg/mL subcutaneous solution: subcutaneous every 6 months (23 Aug 2022 11:01)  simvastatin 20 mg oral tablet: 1 tab(s) orally once a day (23 Aug 2022 11:01)  Vitamin D3 25 mcg (1000 intl units) oral tablet: 1 tab(s) orally once a day (23 Aug 2022 11:01)    MEDICATIONS  (STANDING):  albuterol/ipratropium for Nebulization 3 milliLiter(s) Nebulizer every 6 hours  atorvastatin 40 milliGRAM(s) Oral at bedtime  heparin   Injectable 5000 Unit(s) SubCutaneous every 8 hours  HYDROmorphone PCA (1 mG/mL) 30 milliLiter(s) PCA Continuous PCA Continuous  lactated ringers. 1000 milliLiter(s) (30 mL/Hr) IV Continuous <Continuous>  multivitamin 1 Tablet(s) Oral daily  senna 2 Tablet(s) Oral at bedtime    MEDICATIONS  (PRN):  HYDROmorphone PCA (1 mG/mL) Rescue Clinician Bolus 0.5 milliGRAM(s) IV Push every 15 minutes PRN for Pain Scale GREATER THAN 6  naloxone Injectable 0.1 milliGRAM(s) IV Push every 3 minutes PRN For ANY of the following changes in patient status:  A. RR LESS THAN 10 breaths per minute, B. Oxygen saturation LESS THAN 90%, C. Sedation score of 6  ondansetron Injectable 4 milliGRAM(s) IV Push every 6 hours PRN Nausea        Physical exam:                             General:               Pt is awake, alert,  appears to be in pain but not in distress                                 Neuro:                  Nonfocal                             Psych:                   A&Ox3                          Cardiovascular:   S1 & S2, regular                           Respiratory:         Air entry is fair and equal on both sides, has bilateral conducted sounds                           GI:                          Soft, nondistended and nontender, Bowel sounds active                            Ext:                        No cyanosis or edema     Labs:                                                                 CXR:  Postop changes, left    Plan:  General: 81yFemale s/p FB, Robo LVATS, Lingulectomy, MLND 8/23/2022, experiencing  pain with deep breathing.                             Neuro:                                         Pain control with PCA /  Tylenol PRN                            Cardiovascular:                                          Telemetry (medical test) - Reviewed by me today independently. Normal sinus rhythm with some PVCs / A-fib.                                          Continue hemodynamic monitoring to prevent decompensation.    HLD: On Zocar                            Respiratory:                                         Postop hypoxemia requiring O2 via nasal cannula probably due to postop pain - Wean nasal cannula for goal O2sat above 92%.                                              Obtain CXR . Encourage incentive spirometry.                                                   Chest PT and frequent suctioning. Continue bronchodilators, Pulmozyme and inhaled 3% saline inhalations.                                                      OOB to chair & ambulate w/ assistance.                                                           Continuous pulse oximetry for support & to prevent decompensation.                                         Monitor chest tube output                                         Chest tube to suction                                                                                      GI                                         On puree diet, advance to DASH  diet as tolerated                                         Continue Zofran / Reglan for nausea - PRN                                         Continue bowel regimen	                                                                 Renal:                                         Continue LR  30cc/hr                                         Monitor I/Os and electrolytes                                                                                        Hem/ Onc:                                         DVT prophylaxis with SQ Heparin and SCDs                                         Monitor chest tube output &  signs of bleeding.                                          Follow CBC in AM                           Infectious disease:                                            Monitor for fever / leukocytosis.                                          All surgical incision / chest tube  sites look clean                            Endocrine                                             Continue Accu-Checks with coverage                                                  Pertinent clinical, laboratory, radiographic, hemodynamic, echocardiographic, respiratory data, microbiologic data and chart were reviewed and analyzed frequently throughout the course of the day and night.     Patient seen, examined and plan discussed with CT Surgeon Dr. Baker / CTICU team during rounds.    OOB to chair and ambulate as tolerated.     Status discussed with patient and updated plan of care.     I have spent 40 minutes with this patient including 20 minutes of time coordinating care in the ICU..            Ac Kaur MD                                                                    
Patient seen at bedside, laying in bed, in NAD. Patient states that she is feeling better than yesterday, but says she is still having a fair amount of pain. States she does not want to take the oxy, but will take it because it helps. Denies new chest pain, SOB, dyspnea, n/v/d, fevers or chills.    Vital Signs:  Vital Signs Last 24 Hrs  T(C): 36.7 (08-25-22 @ 08:01), Max: 37 (08-24-22 @ 19:48)  T(F): 98.1 (08-25-22 @ 08:01), Max: 98.6 (08-24-22 @ 19:48)  HR: 76 (08-25-22 @ 09:42) (75 - 86)  BP: 138/77 (08-25-22 @ 08:01) (108/60 - 140/77)  RR: 18 (08-25-22 @ 08:01) (17 - 18)  SpO2: 94% (08-25-22 @ 04:58) (93% - 97%) on (O2)    Telemetry/Alarms:  General: WN/WD NAD  Neurology: Awake, nonfocal, WORTHY x 4  Eyes: Scleras clear, EOMI, Gross vision intact  ENT: Hard of hearing but grossly intact, grossly patent pharynx, no stridor  Neck: Neck supple, trachea midline, No JVD,   Respiratory: Breathing comfortably on RA. Slight dec. BS on L, CTA on R.  CV: RRR, S1S2  Abdominal: Soft, NT, ND +BS,   Extremities: No edema, + peripheral pulses  Psych: Oriented x 3, normal affect  Incisions: LVATS c/d/i  Tubes: L CT to WS, small AL noted, 25cc output.  Relevant labs, radiology and Medications reviewed                          13.4   7.93  )-----------( 192      ( 25 Aug 2022 05:48 )             41.2     08-25    140  |  105  |  13  ----------------------------<  86  4.8   |  24  |  0.92    Ca    9.1      25 Aug 2022 05:48  Phos  2.1     08-25  Mg     1.90     08-25        MEDICATIONS  (STANDING):  acetaminophen     Tablet .. 650 milliGRAM(s) Oral every 6 hours  albuterol/ipratropium for Nebulization 3 milliLiter(s) Nebulizer every 6 hours  atorvastatin 40 milliGRAM(s) Oral at bedtime  gabapentin 100 milliGRAM(s) Oral every 8 hours  heparin   Injectable 5000 Unit(s) SubCutaneous every 8 hours  lidocaine   4% Patch 1 Patch Transdermal every 24 hours  multivitamin 1 Tablet(s) Oral daily  senna 2 Tablet(s) Oral at bedtime    MEDICATIONS  (PRN):  naloxone Injectable 0.1 milliGRAM(s) IV Push every 3 minutes PRN For ANY of the following changes in patient status:  A. RR LESS THAN 10 breaths per minute, B. Oxygen saturation LESS THAN 90%, C. Sedation score of 6  ondansetron Injectable 4 milliGRAM(s) IV Push every 6 hours PRN Nausea  oxyCODONE    IR 5 milliGRAM(s) Oral every 4 hours PRN Moderate Pain (4 - 6)  oxyCODONE    IR 10 milliGRAM(s) Oral every 4 hours PRN Severe Pain (7 - 10)    Pertinent Physical Exam  I&O's Summary    24 Aug 2022 07:01  -  25 Aug 2022 07:00  --------------------------------------------------------  IN: 840 mL / OUT: 1285 mL / NET: -445 mL        Assessment  81y Female  w/ PAST MEDICAL & SURGICAL HISTORY:  Other nonspecific abnormal finding of lung field      H/O pulmonary hypertension      H/O headache      Mild depression      History of COPD  does not use inhalers      Pulmonary nodules      Tricuspid regurgitation  severe      Dilation of thoracic aorta      Osteoporosis      Calcification of coronary artery      HLD (hyperlipidemia)      Hard of hearing      History of appendectomy      History of colon resection  for intenstinal obstruction 2004      Status post open reduction and internal fixation (ORIF) of fracture  right wrist      S/P ear surgery  several      History of cataract surgery  b/l      81 year old female PMH severe tricuspid regurgitation, pulmonary htn, thoracic aorta dilation, colonic resection and lung nodules now POD2 from LVATS, YAAKOV Lingulectomy. Patient with L CT to WS with a small air leak, tube to be kept until AL resolves.    PLAN  Neuro: Continue pain management  Pulm: Encourage coughing, deep breathing and use of incentive spirometry. Daily CXR.   Cardio: Monitor telemetry/alarms  GI: Tolerating diet. Continue stool softeners.  Renal: monitor urine output, supplement electrolytes as needed  Vasc: Heparin SC/SCDs for DVT prophylaxis  Heme: Stable H/H.  ID: Off antibiotics. Stable.  Therapy: OOB/ambulate  Tubes: Monitor Chest tube output, keep to WS, monitor AL status.  Disposition: Aim to D/C to home on  Discussed with Cardiothoracic Team at AM rounds.  
Thoracic Progress Note    S: Patient seen and examined. No acute events overnight. Reports pain with deep inspiration. Tolerating diet.     O:  Physical Exam:  Gen: Laying in bed, NAD  HEENT: atrumatic, EMOI  Resp: Unlabored breathing. Forced expiratory air leak. Serous drainage.   Abd: soft, NT  Ext: Moves 4 extremities spontaneously    Vital Signs Last 24 Hrs  T(C): 36.9 (27 Aug 2022 08:33), Max: 36.9 (27 Aug 2022 08:33)  T(F): 98.4 (27 Aug 2022 08:33), Max: 98.4 (27 Aug 2022 08:33)  HR: 74 (27 Aug 2022 08:33) (70 - 89)  BP: 140/80 (27 Aug 2022 08:33) (116/70 - 145/74)  BP(mean): --  RR: 18 (27 Aug 2022 08:33) (18 - 19)  SpO2: 95% (27 Aug 2022 08:33) (93% - 99%)    Parameters below as of 27 Aug 2022 08:33  Patient On (Oxygen Delivery Method): room air        I&O's Detail    26 Aug 2022 07:01  -  27 Aug 2022 07:00  --------------------------------------------------------  IN:  Total IN: 0 mL    OUT:    Chest Tube (mL): 10 mL    Voided (mL): 1150 mL  Total OUT: 1160 mL    Total NET: -1160 mL                                13.6   5.53  )-----------( 213      ( 27 Aug 2022 07:00 )             42.6       08-27    141  |  105  |  16  ----------------------------<  88  4.3   |  24  |  0.79    Ca    9.2      27 Aug 2022 07:00  Phos  4.0     08-27  Mg     1.90     08-27        
Thoracic Surgery Progress Note    SUBJECTIVE: Patient seen and examined at bedside with surgical team. Pt endorsed mild discomfort near CT insertion site. Pt did not endorse any chest pain, SOB, chills, N/V.    Vital Signs Last 24 Hrs  T(C): 36.6 (26 Aug 2022 12:39), Max: 36.9 (26 Aug 2022 08:20)  T(F): 97.9 (26 Aug 2022 12:39), Max: 98.5 (26 Aug 2022 08:20)  HR: 89 (26 Aug 2022 12:39) (70 - 89)  BP: 116/70 (26 Aug 2022 12:39) (111/77 - 139/73)  BP(mean): --  RR: 18 (26 Aug 2022 12:39) (16 - 18)  SpO2: 95% (26 Aug 2022 12:39) (91% - 100%)    Parameters below as of 26 Aug 2022 12:39  Patient On (Oxygen Delivery Method): room air    I&O's Detail    25 Aug 2022 07:01  -  26 Aug 2022 07:00  --------------------------------------------------------  IN:  Total IN: 0 mL    OUT:    Chest Tube (mL): 100 mL    Voided (mL): 350 mL  Total OUT: 450 mL    Total NET: -450 mL      26 Aug 2022 07:01  -  26 Aug 2022 16:10  --------------------------------------------------------  IN:  Total IN: 0 mL    OUT:    Chest Tube (mL): 10 mL    Voided (mL): 400 mL  Total OUT: 410 mL    Total NET: -410 mL        Medications  MEDICATIONS  (STANDING):  albuterol/ipratropium for Nebulization 3 milliLiter(s) Nebulizer every 6 hours  atorvastatin 40 milliGRAM(s) Oral at bedtime  gabapentin 100 milliGRAM(s) Oral every 8 hours  heparin   Injectable 5000 Unit(s) SubCutaneous every 8 hours  lidocaine   4% Patch 1 Patch Transdermal every 24 hours  multivitamin 1 Tablet(s) Oral daily  senna 2 Tablet(s) Oral at bedtime    MEDICATIONS  (PRN):  naloxone Injectable 0.1 milliGRAM(s) IV Push every 3 minutes PRN For ANY of the following changes in patient status:  A. RR LESS THAN 10 breaths per minute, B. Oxygen saturation LESS THAN 90%, C. Sedation score of 6  ondansetron Injectable 4 milliGRAM(s) IV Push every 6 hours PRN Nausea  oxyCODONE    IR 5 milliGRAM(s) Oral every 4 hours PRN Moderate Pain (4 - 6)  oxyCODONE    IR 10 milliGRAM(s) Oral every 4 hours PRN Severe Pain (7 - 10)      Physical Exam  Constitutional: A&Ox3, NAD  Respiratory: Breathing comfortably on RA. No sign of resp accessory muscle use or respiratory distress  Cardiac: S1. S2  Gastrointestinal: Soft nontender, nondistended  Extremities: Moving all extremities, no edema    LABS:                        13.4   7.93  )-----------( 192      ( 25 Aug 2022 05:48 )             41.2     08-25    140  |  105  |  13  ----------------------------<  86  4.8   |  24  |  0.92    Ca    9.1      25 Aug 2022 05:48  Phos  2.1     08-25  Mg     1.90     08-25

## 2022-08-27 NOTE — DISCHARGE NOTE PROVIDER - NSDCFUADDAPPT_GEN_ALL_CORE_FT
Please call Dr Baker's office to schedule a follow up visit in 2 weeks.  Please schedule a follow up visit with your PCP in 3 weeks

## 2022-08-27 NOTE — PROGRESS NOTE ADULT - PROVIDER SPECIALTY LIST ADULT
Critical Care
Pain Medicine
Thoracic Surgery
Critical Care
Pain Medicine

## 2022-08-27 NOTE — DISCHARGE NOTE PROVIDER - NSDCFUSCHEDAPPT_GEN_ALL_CORE_FT
[facility] Physician Former[facility]  Med Endocr [address] [facility]  Scheduled Appointment: 09/12/2022    Vlad Mead  [facility]  INTMED [address] [facility]  Scheduled Appointment: 11/01/2022

## 2022-08-27 NOTE — DISCHARGE NOTE PROVIDER - NSDCFUADDINST_GEN_ALL_CORE_FT
Shower daily. Do not remove steri strips. Please call Dr Baker's office if you develop fever, shortness of breath/chest pain or wound redness/foul drainage

## 2022-08-27 NOTE — PROGRESS NOTE ADULT - ASSESSMENT
Assessment: 81 year old female PMH severe tricuspid regurgitation, pulmonary htn, thoracic aorta dilation presents to [facility] for planned thoracic surgery for abnormal finding of lung field, now s/p FB, Robo LVATS, Lingulectomy, MLND on 8/23/2022.    PLAN  Neuro: Pain management  Pulm: Clamp left chest tube, will get CXR in 3 hours, if stable will discontinue chest tube.   Encourage coughing, deep breathing and use of incentive spirometry. Daily CXR.   Cardio: Monitor telemetry/alarms  GI: Tolerating diet. Continue stool softeners.  Renal: monitor urine output, supplement electrolytes as needed  Vasc: Heparin SC/SCDs for DVT prophylaxis  Heme: Stable H/H. .   ID: Off antibiotics. Stable.  Therapy: OOB/ambulate  Tubes: L CT switched from suxn to water seal. Monitor Chest tube output  Disposition: Aim to D/C to home once chest tube removed    Thoracic Surgery  [id number]

## 2022-08-27 NOTE — DISCHARGE NOTE PROVIDER - NSDCMRMEDTOKEN_GEN_ALL_CORE_FT
aspirin 325 mg oral tablet: 1 tab(s) orally every 4 hours, As Needed  last dose on 8/15/22  biotin 5000 mcg oral capsule: orally once a day-last dose 08/17/22  Centrum Silver Women&#x27;s oral tablet: 1 tab(s) orally once a day-last dose 08/17/22  Prolia 60 mg/mL subcutaneous solution: subcutaneous every 6 months  simvastatin 20 mg oral tablet: 1 tab(s) orally once a day  Vitamin D3 25 mcg (1000 intl units) oral tablet: 1 tab(s) orally once a day   aspirin 325 mg oral tablet: 1 tab(s) orally every 4 hours, As Needed  last dose on 8/15/22  biotin 5000 mcg oral capsule: orally once a day-last dose 08/17/22  Centrum Silver Women&#x27;s oral tablet: 1 tab(s) orally once a day-last dose 08/17/22  oxyCODONE 5 mg oral tablet: 1 tab(s) orally every 4 hours MDD:6 as needed for moderate pain  polyethylene glycol 3350 oral powder for reconstitution: 17 gram(s) orally once a day as needed for constipation  Prolia 60 mg/mL subcutaneous solution: subcutaneous every 6 months  senna leaf extract oral tablet: 2 tab(s) orally once a day (at bedtime), As Needed for constipation  simvastatin 20 mg oral tablet: 1 tab(s) orally once a day  Tylenol 325 mg oral tablet: 2 tab(s) orally every 4 hours as needed for mild pain  Vitamin D3 25 mcg (1000 intl units) oral tablet: 1 tab(s) orally once a day

## 2022-08-28 ENCOUNTER — TRANSCRIPTION ENCOUNTER (OUTPATIENT)
Age: 82
End: 2022-08-28

## 2022-08-28 VITALS
RESPIRATION RATE: 18 BRPM | DIASTOLIC BLOOD PRESSURE: 64 MMHG | TEMPERATURE: 98 F | OXYGEN SATURATION: 95 % | SYSTOLIC BLOOD PRESSURE: 116 MMHG | HEART RATE: 83 BPM

## 2022-08-28 LAB
ANION GAP SERPL CALC-SCNC: 12 MMOL/L — SIGNIFICANT CHANGE UP (ref 7–14)
BUN SERPL-MCNC: 15 MG/DL — SIGNIFICANT CHANGE UP (ref 7–23)
CALCIUM SERPL-MCNC: 9.2 MG/DL — SIGNIFICANT CHANGE UP (ref 8.4–10.5)
CHLORIDE SERPL-SCNC: 106 MMOL/L — SIGNIFICANT CHANGE UP (ref 98–107)
CO2 SERPL-SCNC: 23 MMOL/L — SIGNIFICANT CHANGE UP (ref 22–31)
CREAT SERPL-MCNC: 0.74 MG/DL — SIGNIFICANT CHANGE UP (ref 0.5–1.3)
EGFR: 81 ML/MIN/1.73M2 — SIGNIFICANT CHANGE UP
GLUCOSE SERPL-MCNC: 96 MG/DL — SIGNIFICANT CHANGE UP (ref 70–99)
HCT VFR BLD CALC: 41.8 % — SIGNIFICANT CHANGE UP (ref 34.5–45)
HGB BLD-MCNC: 13.5 G/DL — SIGNIFICANT CHANGE UP (ref 11.5–15.5)
MAGNESIUM SERPL-MCNC: 1.8 MG/DL — SIGNIFICANT CHANGE UP (ref 1.6–2.6)
MCHC RBC-ENTMCNC: 28.7 PG — SIGNIFICANT CHANGE UP (ref 27–34)
MCHC RBC-ENTMCNC: 32.3 GM/DL — SIGNIFICANT CHANGE UP (ref 32–36)
MCV RBC AUTO: 88.7 FL — SIGNIFICANT CHANGE UP (ref 80–100)
NRBC # BLD: 0 /100 WBCS — SIGNIFICANT CHANGE UP (ref 0–0)
NRBC # FLD: 0 K/UL — SIGNIFICANT CHANGE UP (ref 0–0)
PHOSPHATE SERPL-MCNC: 4.4 MG/DL — SIGNIFICANT CHANGE UP (ref 2.5–4.5)
PLATELET # BLD AUTO: 218 K/UL — SIGNIFICANT CHANGE UP (ref 150–400)
POTASSIUM SERPL-MCNC: 3.9 MMOL/L — SIGNIFICANT CHANGE UP (ref 3.5–5.3)
POTASSIUM SERPL-SCNC: 3.9 MMOL/L — SIGNIFICANT CHANGE UP (ref 3.5–5.3)
RBC # BLD: 4.71 M/UL — SIGNIFICANT CHANGE UP (ref 3.8–5.2)
RBC # FLD: 13.7 % — SIGNIFICANT CHANGE UP (ref 10.3–14.5)
SODIUM SERPL-SCNC: 141 MMOL/L — SIGNIFICANT CHANGE UP (ref 135–145)
WBC # BLD: 6.06 K/UL — SIGNIFICANT CHANGE UP (ref 3.8–10.5)
WBC # FLD AUTO: 6.06 K/UL — SIGNIFICANT CHANGE UP (ref 3.8–10.5)

## 2022-08-28 PROCEDURE — 71045 X-RAY EXAM CHEST 1 VIEW: CPT | Mod: 26

## 2022-08-28 PROCEDURE — 71045 X-RAY EXAM CHEST 1 VIEW: CPT | Mod: 26,77

## 2022-08-28 RX ORDER — OXYCODONE HYDROCHLORIDE 5 MG/1
1 TABLET ORAL
Qty: 30 | Refills: 0
Start: 2022-08-28 | End: 2022-09-01

## 2022-08-28 RX ORDER — SENNA PLUS 8.6 MG/1
2 TABLET ORAL
Qty: 0 | Refills: 0 | DISCHARGE
Start: 2022-08-28

## 2022-08-28 RX ORDER — POLYETHYLENE GLYCOL 3350 17 G/17G
17 POWDER, FOR SOLUTION ORAL
Qty: 0 | Refills: 0 | DISCHARGE
Start: 2022-08-28

## 2022-08-28 RX ORDER — ACETAMINOPHEN 500 MG
2 TABLET ORAL
Qty: 0 | Refills: 0 | DISCHARGE

## 2022-08-28 RX ADMIN — Medication 1 TABLET(S): at 11:53

## 2022-08-28 RX ADMIN — GABAPENTIN 100 MILLIGRAM(S): 400 CAPSULE ORAL at 13:10

## 2022-08-28 RX ADMIN — Medication 3 MILLILITER(S): at 10:16

## 2022-08-28 RX ADMIN — Medication 1000 MILLIGRAM(S): at 10:46

## 2022-08-28 RX ADMIN — GABAPENTIN 100 MILLIGRAM(S): 400 CAPSULE ORAL at 05:41

## 2022-08-28 RX ADMIN — Medication 1000 MILLIGRAM(S): at 11:24

## 2022-08-28 NOTE — DISCHARGE NOTE NURSING/CASE MANAGEMENT/SOCIAL WORK - NSDCPEFALRISK_GEN_ALL_CORE
For information on Fall & Injury Prevention, visit: [email or web address] OR  [email or web address] OR  https://www.cdc.gov/steadi/patient.html

## 2022-08-28 NOTE — DISCHARGE NOTE NURSING/CASE MANAGEMENT/SOCIAL WORK - NSDCPNINST_GEN_ALL_CORE
Reviewed discharge instructions with patient. Instructed patient to take all medications as prescribed, to follow up with healthcare provider and to call 911 if patient experiences chest pain, shortness of breath, difficulty breathing or fever.

## 2022-08-28 NOTE — DISCHARGE NOTE NURSING/CASE MANAGEMENT/SOCIAL WORK - PATIENT PORTAL LINK FT
You can access the FollowMyHealth Patient Portal offered by [facility] by registering at the following website: [email or web address]. By joining [facility]’s FollowMyHealth portal, you will also be able to view your health information using other applications (apps) compatible with our system.

## 2022-08-29 ENCOUNTER — NON-APPOINTMENT (OUTPATIENT)
Age: 82
End: 2022-08-29

## 2022-08-30 LAB — SURGICAL PATHOLOGY STUDY: SIGNIFICANT CHANGE UP

## 2022-09-07 ENCOUNTER — RESULT REVIEW (OUTPATIENT)
Age: 82
End: 2022-09-07

## 2022-09-07 ENCOUNTER — OUTPATIENT (OUTPATIENT)
Dept: OUTPATIENT SERVICES | Facility: HOSPITAL | Age: 82
LOS: 1 days | End: 2022-09-07

## 2022-09-07 ENCOUNTER — APPOINTMENT (OUTPATIENT)
Dept: THORACIC SURGERY | Facility: CLINIC | Age: 82
End: 2022-09-07

## 2022-09-07 ENCOUNTER — APPOINTMENT (OUTPATIENT)
Age: 82
End: 2022-09-07

## 2022-09-07 VITALS
BODY MASS INDEX: 27.82 KG/M2 | OXYGEN SATURATION: 97 % | HEART RATE: 69 BPM | RESPIRATION RATE: 16 BRPM | HEIGHT: 65.5 IN | DIASTOLIC BLOOD PRESSURE: 81 MMHG | SYSTOLIC BLOOD PRESSURE: 126 MMHG | WEIGHT: 169 LBS

## 2022-09-07 DIAGNOSIS — R91.1 SOLITARY PULMONARY NODULE: ICD-10-CM

## 2022-09-07 DIAGNOSIS — Z90.49 ACQUIRED ABSENCE OF OTHER SPECIFIED PARTS OF DIGESTIVE TRACT: Chronic | ICD-10-CM

## 2022-09-07 DIAGNOSIS — Z98.890 OTHER SPECIFIED POSTPROCEDURAL STATES: Chronic | ICD-10-CM

## 2022-09-07 DIAGNOSIS — Z98.49 CATARACT EXTRACTION STATUS, UNSPECIFIED EYE: Chronic | ICD-10-CM

## 2022-09-07 DIAGNOSIS — C34.92 MALIGNANT NEOPLASM OF UNSPECIFIED PART OF LEFT BRONCHUS OR LUNG: ICD-10-CM

## 2022-09-07 PROCEDURE — 99024 POSTOP FOLLOW-UP VISIT: CPT

## 2022-09-07 PROCEDURE — 71046 X-RAY EXAM CHEST 2 VIEWS: CPT | Mod: 26

## 2022-09-09 ENCOUNTER — NON-APPOINTMENT (OUTPATIENT)
Age: 82
End: 2022-09-09

## 2022-09-12 ENCOUNTER — APPOINTMENT (OUTPATIENT)
Dept: ENDOCRINOLOGY | Facility: CLINIC | Age: 82
End: 2022-09-12

## 2022-09-12 ENCOUNTER — MED ADMIN CHARGE (OUTPATIENT)
Age: 82
End: 2022-09-12

## 2022-09-12 PROCEDURE — 96372 THER/PROPH/DIAG INJ SC/IM: CPT

## 2022-09-12 RX ORDER — DENOSUMAB 60 MG/ML
60 INJECTION SUBCUTANEOUS
Qty: 1 | Refills: 0 | Status: COMPLETED | OUTPATIENT
Start: 2022-09-08

## 2022-10-11 PROBLEM — Z12.39 SCREENING BREAST EXAMINATION: Status: ACTIVE | Noted: 2022-10-11

## 2022-11-01 ENCOUNTER — APPOINTMENT (OUTPATIENT)
Dept: INTERNAL MEDICINE | Facility: CLINIC | Age: 82
End: 2022-11-01

## 2022-11-01 VITALS — DIASTOLIC BLOOD PRESSURE: 72 MMHG | SYSTOLIC BLOOD PRESSURE: 130 MMHG

## 2022-11-01 VITALS
WEIGHT: 172 LBS | TEMPERATURE: 97.1 F | HEART RATE: 76 BPM | DIASTOLIC BLOOD PRESSURE: 84 MMHG | SYSTOLIC BLOOD PRESSURE: 130 MMHG | BODY MASS INDEX: 28.19 KG/M2 | OXYGEN SATURATION: 98 %

## 2022-11-01 PROCEDURE — 99214 OFFICE O/P EST MOD 30 MIN: CPT | Mod: 25

## 2022-11-01 PROCEDURE — 93000 ELECTROCARDIOGRAM COMPLETE: CPT

## 2022-11-01 NOTE — ASSESSMENT
[FreeTextEntry1] : \par \par ecg---done in office today for pulmonary hypertension; SR; WNL\par \par imp---adenoca. lung---recent VATS and lingulectomy; neg. nodes\par          COPD---ex. smoker; denies cough; chronic METZGER\par          pulmonary hypertension--49 mm on echo of 6/9/22\par          dilation TA===4 cm. on above echo\par          SOB---probably pulmonary; BNP=87 normal LV function on echo\par \par plan--pulmonary MD ---pt. to f/u with pulmonologist; she doesn't know name; will contact me\par          PCP/internal medicine f/u with Dr. Melara\par          cardiology f/u with Dr. Nuñez in 6 mos\par          continue simvastatin for hyperlipidemia\par          FBW:   CBC, CMP, lipids\par \par

## 2022-11-01 NOTE — REASON FOR VISIT
[Symptom and Test Evaluation] : symptom and test evaluation [CV Risk Factors and Non-Cardiac Disease] : CV risk factors and non-cardiac disease [Hyperlipidemia] : hyperlipidemia [FreeTextEntry1] : s/p VATs etc. late Aug. 2022; neg. LN's etc.; path:  adenocarcinoma (lung)\par generally feeling well but states that stamina not quite what it was ...."tired most of the time";\par sleeping is a problem; has to get up several times to use bathroom; problem is several yrs. in\par duration;\par SOB believes is also slightly worse post-op.\par had pulmonary hypertension  pre-op. \par hasn't transitioned to another pulmonologist

## 2022-11-02 ENCOUNTER — NON-APPOINTMENT (OUTPATIENT)
Age: 82
End: 2022-11-02

## 2022-11-04 LAB
ALBUMIN SERPL ELPH-MCNC: 4 G/DL
ALP BLD-CCNC: 62 U/L
ALT SERPL-CCNC: 12 U/L
ANION GAP SERPL CALC-SCNC: 9 MMOL/L
AST SERPL-CCNC: 23 U/L
BASOPHILS # BLD AUTO: 0.06 K/UL
BASOPHILS NFR BLD AUTO: 1 %
BILIRUB SERPL-MCNC: 0.5 MG/DL
BUN SERPL-MCNC: 20 MG/DL
CALCIUM SERPL-MCNC: 9.7 MG/DL
CHLORIDE SERPL-SCNC: 103 MMOL/L
CHOLEST SERPL-MCNC: 194 MG/DL
CO2 SERPL-SCNC: 25 MMOL/L
CREAT SERPL-MCNC: 0.71 MG/DL
EGFR: 85 ML/MIN/1.73M2
EOSINOPHIL # BLD AUTO: 0.32 K/UL
EOSINOPHIL NFR BLD AUTO: 5.3 %
GLUCOSE SERPL-MCNC: 89 MG/DL
HCT VFR BLD CALC: 43.2 %
HDLC SERPL-MCNC: 76 MG/DL
HGB BLD-MCNC: 14.1 G/DL
IMM GRANULOCYTES NFR BLD AUTO: 0.3 %
LDLC SERPL CALC-MCNC: 93 MG/DL
LYMPHOCYTES # BLD AUTO: 1.31 K/UL
LYMPHOCYTES NFR BLD AUTO: 21.6 %
MAN DIFF?: NORMAL
MCHC RBC-ENTMCNC: 28.8 PG
MCHC RBC-ENTMCNC: 32.6 GM/DL
MCV RBC AUTO: 88.2 FL
MONOCYTES # BLD AUTO: 0.51 K/UL
MONOCYTES NFR BLD AUTO: 8.4 %
NEUTROPHILS # BLD AUTO: 3.84 K/UL
NEUTROPHILS NFR BLD AUTO: 63.4 %
NONHDLC SERPL-MCNC: 118 MG/DL
PLATELET # BLD AUTO: 184 K/UL
POTASSIUM SERPL-SCNC: 4.6 MMOL/L
PROT SERPL-MCNC: 6.9 G/DL
RBC # BLD: 4.9 M/UL
RBC # FLD: 14 %
SODIUM SERPL-SCNC: 138 MMOL/L
TRIGL SERPL-MCNC: 124 MG/DL
WBC # FLD AUTO: 6.06 K/UL

## 2022-11-28 ENCOUNTER — APPOINTMENT (OUTPATIENT)
Dept: CT IMAGING | Facility: CLINIC | Age: 82
End: 2022-11-28

## 2022-11-28 ENCOUNTER — OUTPATIENT (OUTPATIENT)
Dept: OUTPATIENT SERVICES | Facility: HOSPITAL | Age: 82
LOS: 1 days | End: 2022-11-28
Payer: MEDICARE

## 2022-11-28 DIAGNOSIS — Z90.49 ACQUIRED ABSENCE OF OTHER SPECIFIED PARTS OF DIGESTIVE TRACT: Chronic | ICD-10-CM

## 2022-11-28 DIAGNOSIS — C34.92 MALIGNANT NEOPLASM OF UNSPECIFIED PART OF LEFT BRONCHUS OR LUNG: ICD-10-CM

## 2022-11-28 DIAGNOSIS — Z98.890 OTHER SPECIFIED POSTPROCEDURAL STATES: Chronic | ICD-10-CM

## 2022-11-28 DIAGNOSIS — Z98.49 CATARACT EXTRACTION STATUS, UNSPECIFIED EYE: Chronic | ICD-10-CM

## 2022-11-28 PROCEDURE — 71250 CT THORAX DX C-: CPT | Mod: 26,MH

## 2022-11-28 PROCEDURE — 71250 CT THORAX DX C-: CPT

## 2022-12-02 ENCOUNTER — APPOINTMENT (OUTPATIENT)
Dept: THORACIC SURGERY | Facility: CLINIC | Age: 82
End: 2022-12-02

## 2022-12-02 ENCOUNTER — NON-APPOINTMENT (OUTPATIENT)
Age: 82
End: 2022-12-02

## 2022-12-02 VITALS
HEART RATE: 60 BPM | RESPIRATION RATE: 16 BRPM | WEIGHT: 176 LBS | SYSTOLIC BLOOD PRESSURE: 132 MMHG | BODY MASS INDEX: 28.97 KG/M2 | OXYGEN SATURATION: 95 % | DIASTOLIC BLOOD PRESSURE: 75 MMHG | HEIGHT: 65.5 IN

## 2022-12-02 DIAGNOSIS — R91.1 SOLITARY PULMONARY NODULE: ICD-10-CM

## 2022-12-02 PROCEDURE — 99214 OFFICE O/P EST MOD 30 MIN: CPT

## 2022-12-03 NOTE — HISTORY OF PRESENT ILLNESS
[FreeTextEntry1] : Ms. DUSTIN NEIL, 81 year old female,former smoker (1 PPD x 30 years; Quit 1990), w/ hx of COPD, HLD Osteoporosis (Last Prolia injection March, 2022), Dilated thoracic aorta and pulmonary nodules. Found to have lobulated nodule in the lingula. \par \par Now, s/p Bronch, Left VATS, lingulectomy with hilar and mediastinal lymph node sampling on 8/23/22. 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 Grade 3 (>20% complex or micropapillary); 1.3 cm; All margins and LN (0/19) negative; pT1b pN0\par \par CT Chest on 11/28/2022:\par - Status post lingulectomy. \par - Resolved small bilateral pleural effusions and left pneumothorax. \par - Mild biapical scarring is noted. \par - Centrilobular emphysema is present.\par - Small hiatal hernia.\par \par Today, he is here for 3 month follow up. Admits to increased sensation. Denies SOB, CP, cough.

## 2023-01-12 ENCOUNTER — APPOINTMENT (OUTPATIENT)
Dept: PULMONOLOGY | Facility: CLINIC | Age: 83
End: 2023-01-12
Payer: MEDICARE

## 2023-01-12 VITALS
OXYGEN SATURATION: 97 % | SYSTOLIC BLOOD PRESSURE: 134 MMHG | DIASTOLIC BLOOD PRESSURE: 87 MMHG | BODY MASS INDEX: 29.11 KG/M2 | TEMPERATURE: 98 F | HEART RATE: 73 BPM | WEIGHT: 179 LBS | HEIGHT: 65.7 IN

## 2023-01-12 DIAGNOSIS — R06.02 SHORTNESS OF BREATH: ICD-10-CM

## 2023-01-12 PROCEDURE — 94726 PLETHYSMOGRAPHY LUNG VOLUMES: CPT

## 2023-01-12 PROCEDURE — ZZZZZ: CPT

## 2023-01-12 PROCEDURE — 94010 BREATHING CAPACITY TEST: CPT

## 2023-01-12 PROCEDURE — 99214 OFFICE O/P EST MOD 30 MIN: CPT | Mod: 25

## 2023-01-12 PROCEDURE — 94729 DIFFUSING CAPACITY: CPT

## 2023-01-12 RX ORDER — ALBUTEROL SULFATE 90 UG/1
108 (90 BASE) INHALANT RESPIRATORY (INHALATION)
Qty: 1 | Refills: 4 | Status: ACTIVE | COMMUNITY
Start: 2023-01-12 | End: 1900-01-01

## 2023-01-12 NOTE — ASSESSMENT
[FreeTextEntry1] : 81 year old female,former smoker (1 PPD x 30 years; Quit 1990), w/ hx of COPD, HLD Osteoporosis (Last Prolia injection March, 2022), Dilated thoracic aorta and pulmonary nodules. Found to have lobulated nodule in the lingula. \par She is status post Left VATS, lingulectomy with hilar and mediastinal lymph node sampling on 8/23/22. Path Adenocarcinoma, acinar predominant, pT1b pN0,\par Comes in for evaluation of episodic dyspnea with exertion occuring less than once per month without associated symptoms.  \par \par On PE today VSS.Oxygen saturation at erst at RA is 97%  LUngs are clear Heart sounds RRR there is no edema.\par \par PFTs show normal spirometry and lung volumes.  DLCO is mildly reduced.\par Reviewed CT Chest - some biapical scarring and mild emphysema. \par \par She had a normal stress thallium in 2016\par Echo 6/2022 showed mild MR. Mild AR Mild aortic root dilatation. Normal LV systolic function without regional wall motion abnormalities\par severe TR, estimated RVSP 49 consistent with mild PH.\par \par Impression:  She is at risk for COPD given smoking history and recent lung resection, however pulmonary funciton is good and she denies episodes of bronchitis.  Mild ph does not account for the episodic nature of her symptoms.  \par Plan:\par albuterol as needed during these episodes.  Advised to call me if they occur more frequently.\par encouraged gradual exercise, weight loss to return to previous level of fitness.  \par \par

## 2023-01-12 NOTE — HISTORY OF PRESENT ILLNESS
[>= 20 pack years] : >= 20 pack years [TextBox_4] : Ms. DUSTIN NEIL, 81 year old female,former smoker (1 PPD x 30 years; Quit 1990), w/ hx of COPD, HLD Osteoporosis (Last Prolia injection March, 2022), Dilated thoracic aorta and pulmonary nodules. Found to have lobulated nodule in the lingula. \par \par Now, s/p Bronch, Left VATS, lingulectomy with hilar and mediastinal lymph node sampling on 8/23/22. 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 Grade 3 (>20% complex or micropapillary); 1.3 cm; All margins and LN (0/19) negative; pT1b pN0\par \par CT Chest on 11/28/2022:\par - Status post lingulectomy. \par - Resolved small bilateral pleural effusions and left pneumothorax. \par - Mild biapical scarring is noted. \par - Centrilobular emphysema is present.\par - Small hiatal hernia.\par \par She comes in for episodic breathing difficulty.  She is a former > 30 pack year tobacco user who quit in 1990. \par Never short of breath at rest  Has occasional episodes of shortness of breath while exerting herself.  it is episodic, has been rare. passes when she rests.  Occurs less than once per month.  Happened yesterday.  NO associated wheezing, cough, GERD or palpitations. Problem has not occurred more frequently since the surgery.  She does report weight gain since her lung resection and that she feels tired more easily.  Her energy level has dropped.  SHe used to exercise on the treadmill daily for 30 minutes at a rapid rate and now can walk 10-15 minutes and feels exhausted- legs heavy, not necessarily SOB.  \par  Used to see Dr. Mead who was primary and also cardiology.  He is now retireed.  She states that he worked up her heart and it is not the problem.  \par \par  [TextBox_11] : 1 [TextBox_13] : 30 [YearQuit] : 1990

## 2023-03-03 ENCOUNTER — APPOINTMENT (OUTPATIENT)
Dept: INTERNAL MEDICINE | Facility: CLINIC | Age: 83
End: 2023-03-03
Payer: MEDICARE

## 2023-03-03 VITALS
OXYGEN SATURATION: 96 % | BODY MASS INDEX: 29.16 KG/M2 | SYSTOLIC BLOOD PRESSURE: 134 MMHG | WEIGHT: 179 LBS | TEMPERATURE: 97 F | DIASTOLIC BLOOD PRESSURE: 82 MMHG | HEART RATE: 70 BPM

## 2023-03-03 DIAGNOSIS — M62.81 MUSCLE WEAKNESS (GENERALIZED): ICD-10-CM

## 2023-03-03 DIAGNOSIS — M54.2 CERVICALGIA: ICD-10-CM

## 2023-03-03 PROCEDURE — 99214 OFFICE O/P EST MOD 30 MIN: CPT | Mod: 25

## 2023-03-03 PROCEDURE — 36415 COLL VENOUS BLD VENIPUNCTURE: CPT

## 2023-03-03 NOTE — ASSESSMENT
[FreeTextEntry1] : imp---adenoca. lung---recent VATS and lingulectomy; neg. nodes\par          COPD---ex. smoker; denies cough; chronic METZGER\par          pulmonary hypertension--49 mm on echo of 6/9/22\par          dilation TA===4 cm. on above echo\par          SOB---probably pulmonary; BNP=87 normal LV function on echo\par \par plan--pulmonary f/u with Dr. Elaine \par          cardiology f/u with Dr. Nuñez \par          continue simvastatin for hyperlipidemia\par \par \par #proximal muscle weakness\par #neck pain\par #diplopia\par neuro-ophtho eval pending\par MR orbit pending per ophtho\par serologic work-up sent\par x-ray cervical spine \par \par

## 2023-03-03 NOTE — HISTORY OF PRESENT ILLNESS
[FreeTextEntry1] : Patient presents today for follow-up of chronic medical conditions.  [de-identified] : Patient presents today for follow-up of chronic medical conditions. \par In January started having severe pain - proximal muscles - shoulders\par 10/10 - alleviates throughout the day - but never ceases \par taking lots of aspirin and tylenol arthritis with minimal relief \par also some diplopia, but this has been ongoing for years since her cataract surgery

## 2023-03-07 LAB
25(OH)D3 SERPL-MCNC: 38.3 NG/ML
ACRM BINDING ANTIBODY: <0.03 NMOL/L
ALBUMIN SERPL ELPH-MCNC: 4.4 G/DL
ALDOLASE SERPL-CCNC: 10.8 U/L
ALP BLD-CCNC: 57 U/L
ALT SERPL-CCNC: 14 U/L
ANION GAP SERPL CALC-SCNC: 15 MMOL/L
APO LP(A) SERPL-MCNC: 16.4 NMOL/L
AST SERPL-CCNC: 24 U/L
BASOPHILS # BLD AUTO: 0.06 K/UL
BASOPHILS NFR BLD AUTO: 0.9 %
BILIRUB SERPL-MCNC: 0.4 MG/DL
BUN SERPL-MCNC: 18 MG/DL
CALCIUM SERPL-MCNC: 10.2 MG/DL
CENTROMERE IGG SER-ACNC: <0.2 CD:130001892
CHLORIDE SERPL-SCNC: 104 MMOL/L
CHOLEST SERPL-MCNC: 185 MG/DL
CK SERPL-CCNC: 55 U/L
CO2 SERPL-SCNC: 22 MMOL/L
CREAT SERPL-MCNC: 0.81 MG/DL
CRP SERPL-MCNC: 4 MG/L
DSDNA AB SER-ACNC: <12 IU/ML
EGFR: 72 ML/MIN/1.73M2
ENA JO1 AB SER IA-ACNC: <0.2 AL
EOSINOPHIL # BLD AUTO: 0.1 K/UL
EOSINOPHIL NFR BLD AUTO: 1.5 %
ERYTHROCYTE [SEDIMENTATION RATE] IN BLOOD BY WESTERGREN METHOD: 10 MM/HR
ESTIMATED AVERAGE GLUCOSE: 108 MG/DL
GLUCOSE SERPL-MCNC: 89 MG/DL
HBA1C MFR BLD HPLC: 5.4 %
HCT VFR BLD CALC: 48.1 %
HDLC SERPL-MCNC: 71 MG/DL
HGB BLD-MCNC: 15.1 G/DL
IMM GRANULOCYTES NFR BLD AUTO: 0.1 %
LDLC SERPL CALC-MCNC: 92 MG/DL
LYMPHOCYTES # BLD AUTO: 1.78 K/UL
LYMPHOCYTES NFR BLD AUTO: 26.1 %
MAN DIFF?: NORMAL
MCHC RBC-ENTMCNC: 28.3 PG
MCHC RBC-ENTMCNC: 31.4 GM/DL
MCV RBC AUTO: 90.1 FL
MONOCYTES # BLD AUTO: 0.54 K/UL
MONOCYTES NFR BLD AUTO: 7.9 %
NEUTROPHILS # BLD AUTO: 4.34 K/UL
NEUTROPHILS NFR BLD AUTO: 63.5 %
NONHDLC SERPL-MCNC: 114 MG/DL
PLATELET # BLD AUTO: 244 K/UL
POTASSIUM SERPL-SCNC: 4.5 MMOL/L
PROT SERPL-MCNC: 7.3 G/DL
RBC # BLD: 5.34 M/UL
RBC # FLD: 13.2 %
RIBOSOMAL P AB SER IA-ACNC: <0.2 AL
SODIUM SERPL-SCNC: 141 MMOL/L
TRIGL SERPL-MCNC: 109 MG/DL
TSH SERPL-ACNC: 1.93 UIU/ML
WBC # FLD AUTO: 6.83 K/UL

## 2023-03-10 ENCOUNTER — OUTPATIENT (OUTPATIENT)
Dept: OUTPATIENT SERVICES | Facility: HOSPITAL | Age: 83
LOS: 1 days | End: 2023-03-10
Payer: MEDICARE

## 2023-03-10 ENCOUNTER — APPOINTMENT (OUTPATIENT)
Dept: RADIOLOGY | Facility: CLINIC | Age: 83
End: 2023-03-10
Payer: MEDICARE

## 2023-03-10 DIAGNOSIS — Z90.49 ACQUIRED ABSENCE OF OTHER SPECIFIED PARTS OF DIGESTIVE TRACT: Chronic | ICD-10-CM

## 2023-03-10 DIAGNOSIS — M54.2 CERVICALGIA: ICD-10-CM

## 2023-03-10 DIAGNOSIS — Z98.890 OTHER SPECIFIED POSTPROCEDURAL STATES: Chronic | ICD-10-CM

## 2023-03-10 DIAGNOSIS — Z98.49 CATARACT EXTRACTION STATUS, UNSPECIFIED EYE: Chronic | ICD-10-CM

## 2023-03-10 PROCEDURE — 72040 X-RAY EXAM NECK SPINE 2-3 VW: CPT | Mod: 26

## 2023-03-10 PROCEDURE — 72040 X-RAY EXAM NECK SPINE 2-3 VW: CPT

## 2023-03-20 ENCOUNTER — APPOINTMENT (OUTPATIENT)
Dept: ENDOCRINOLOGY | Facility: CLINIC | Age: 83
End: 2023-03-20

## 2023-03-23 ENCOUNTER — APPOINTMENT (OUTPATIENT)
Dept: ENDOCRINOLOGY | Facility: CLINIC | Age: 83
End: 2023-03-23
Payer: MEDICARE

## 2023-03-23 VITALS
WEIGHT: 168 LBS | DIASTOLIC BLOOD PRESSURE: 80 MMHG | HEART RATE: 75 BPM | BODY MASS INDEX: 27.99 KG/M2 | SYSTOLIC BLOOD PRESSURE: 130 MMHG | OXYGEN SATURATION: 97 % | HEIGHT: 65 IN

## 2023-03-23 PROCEDURE — 99214 OFFICE O/P EST MOD 30 MIN: CPT | Mod: 25

## 2023-03-23 PROCEDURE — 96372 THER/PROPH/DIAG INJ SC/IM: CPT

## 2023-03-23 RX ORDER — DENOSUMAB 60 MG/ML
60 INJECTION SUBCUTANEOUS
Qty: 1 | Refills: 0 | Status: COMPLETED | OUTPATIENT
Start: 2023-03-23

## 2023-03-23 RX ADMIN — DENOSUMAB 60 MG/ML: 60 INJECTION SUBCUTANEOUS at 00:00

## 2023-03-24 NOTE — HISTORY OF PRESENT ILLNESS
[Risedronate (Actonel)] : Risedronate [Denosumab (Prolia)] : Denosumab [FreeTextEntry1] : . No interval   fractures,  \par c/o marked myalgias, prox upper and lower ext.  Recent labs show no increase in creatinine kinase or sedimentation rate.  Patient has no proximal muscle weakness.\par Now, s/p Bronch, Left VATS, lingulectomy with hilar and mediastinal lymph node sampling on 8/23/22. 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 Grade 3 (>20% complex or micropapillary); 1.3 cm; All margins and LN (0/19) negative; pT1b pN0\par No chemoRx or RT\par \par Prior Actonel use for many years.On Prolia since 11/2011. Tolerating well. No thigh pain, no interval fx. Normal Ca. Last DDS within past 6 months. No ONJ. Not planning major dental work. BMD 12/2015 improved. BMD 2016 and 2018 stable. 1/2018 R wrist fx after fall on broken concrete, in cast until she had a thumb tendon rupture and surgery April 3rd 2018, followed by PT. Pt reports broken hand 12/2019, fall from standing height. BMD 2/2020 indicated stable osteopenia in total hip and improving osteopenia in femoral neck and proximal radius.\par seeing ophtho for waviness, ? diplopia, prior cataract surgery

## 2023-03-24 NOTE — ASSESSMENT
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 \par \par Previously treated with Actonel. Tolerating Prolia since 11/2011. Tolerating well. No thigh pain, no interval fx. Normal Ca. No ONJ. BMD 12/2015 improved. BMD 2016 and 2018 markedly improved vs 2011. BMD 2/2020 indicated stable osteopenia in total hip and improving osteopenia in femoral neck and proximal radius. BMD 3/2022 indicates stable osteopenia in hip and proximal radius. BMD results reviewed w/ pt. Continue Prolia, buy and bill.\par \par Discussed transitioning to oral bisphosphonates to benefit from future drug holiday vs. c/w Prolia. Pt elects to c/w Prolia.\par \par HLD well controlled on current medication but c/o myalgias. suggest hold lipitor for 4 weeks and assess sx'x\par \par  \par F/u in 6 months

## 2023-03-24 NOTE — PHYSICAL EXAM
[Alert] : alert [Well Nourished] : well nourished [No Acute Distress] : no acute distress [Well Developed] : well developed [Normal Sclera/Conjunctiva] : normal sclera/conjunctiva [EOMI] : extra ocular movement intact [No Proptosis] : no proptosis [Thyroid Not Enlarged] : the thyroid was not enlarged [No Thyroid Nodules] : no palpable thyroid nodules [Clear to Auscultation] : lungs were clear to auscultation bilaterally [Normal S1, S2] : normal S1 and S2 [Normal Rate] : heart rate was normal [Regular Rhythm] : with a regular rhythm [No Edema] : no peripheral edema [Normal Bowel Sounds] : normal bowel sounds [Not Tender] : non-tender [Not Distended] : not distended [Soft] : abdomen soft [Normal Anterior Cervical Nodes] : no anterior cervical lymphadenopathy [No Spinal Tenderness] : no spinal tenderness [Spine Straight] : spine straight [No Stigmata of Cushings Syndrome] : no stigmata of Cushings Syndrome [Normal Gait] : normal gait [Normal Reflexes] : deep tendon reflexes were 2+ and symmetric [No Tremors] : no tremors [Oriented x3] : oriented to person, place, and time [No Motor Deficits] : the motor exam was normal

## 2023-05-01 ENCOUNTER — APPOINTMENT (OUTPATIENT)
Dept: RHEUMATOLOGY | Facility: CLINIC | Age: 83
End: 2023-05-01
Payer: MEDICARE

## 2023-05-01 VITALS
HEART RATE: 89 BPM | DIASTOLIC BLOOD PRESSURE: 82 MMHG | HEIGHT: 65 IN | WEIGHT: 166 LBS | SYSTOLIC BLOOD PRESSURE: 121 MMHG | BODY MASS INDEX: 27.66 KG/M2 | RESPIRATION RATE: 16 BRPM | TEMPERATURE: 97.1 F | OXYGEN SATURATION: 98 %

## 2023-05-01 DIAGNOSIS — Z79.52 LONG TERM (CURRENT) USE OF SYSTEMIC STEROIDS: ICD-10-CM

## 2023-05-01 PROCEDURE — 99204 OFFICE O/P NEW MOD 45 MIN: CPT

## 2023-05-08 ENCOUNTER — NON-APPOINTMENT (OUTPATIENT)
Age: 83
End: 2023-05-08

## 2023-05-08 ENCOUNTER — APPOINTMENT (OUTPATIENT)
Dept: CARDIOLOGY | Facility: CLINIC | Age: 83
End: 2023-05-08
Payer: MEDICARE

## 2023-05-08 VITALS
BODY MASS INDEX: 28.66 KG/M2 | HEART RATE: 84 BPM | DIASTOLIC BLOOD PRESSURE: 68 MMHG | WEIGHT: 172 LBS | HEIGHT: 65 IN | SYSTOLIC BLOOD PRESSURE: 106 MMHG

## 2023-05-08 PROCEDURE — 99215 OFFICE O/P EST HI 40 MIN: CPT

## 2023-05-08 PROCEDURE — 93000 ELECTROCARDIOGRAM COMPLETE: CPT

## 2023-05-10 ENCOUNTER — NON-APPOINTMENT (OUTPATIENT)
Age: 83
End: 2023-05-10

## 2023-05-12 PROBLEM — Z79.52 CURRENT CHRONIC USE OF SYSTEMIC STEROIDS: Status: ACTIVE | Noted: 2023-05-12

## 2023-05-12 NOTE — HISTORY OF PRESENT ILLNESS
[FreeTextEntry1] : Patient presents for an evaluation today \par She woke up with pain in the arms (shoulder down to the elbow) and also in the lower extremities.  Pain was worse in the morning and would dissipate as the day went on.  Pain came on suddenly \par she was seen by PCP and was told to see a neurologist.  She went to see a neurologist.  She was told by a neurologist that she has PMR.  She was prescribed prednisone but did not want to take. \par She went to see her endocrinologist and it was suggested that she sees a rheumatologist. \par She is currently taking prednisone 5 mg twice a day and she has no pain. Pain improved within a few days after starting taking the prednisone. She has been taking \par she has blood work done before starting steroids and ESR/CRP/CPK wnl; centromere and dsDNA wnl; TSH wnl \par no headaches, no vision change, no jaw claudication\par \par

## 2023-05-12 NOTE — ASSESSMENT
[FreeTextEntry1] : Patient with clinical symptoms consistent with PMR \par Diagnosis and disease course discussed at length\par she has been taking prednisone 10 mg for almost 2 months at this time. \par I recommend a slow/gradual taper moving forward\par Start 9 mg daily with a plan to taper daily dose by 1 mg every 2-4 weeks as tolerated\par \par All questions answered\par \par OV 1 month, sooner PRN

## 2023-05-26 ENCOUNTER — APPOINTMENT (OUTPATIENT)
Dept: INTERNAL MEDICINE | Facility: CLINIC | Age: 83
End: 2023-05-26
Payer: MEDICARE

## 2023-05-26 VITALS
WEIGHT: 170 LBS | HEIGHT: 65 IN | TEMPERATURE: 97.3 F | SYSTOLIC BLOOD PRESSURE: 130 MMHG | OXYGEN SATURATION: 97 % | BODY MASS INDEX: 28.32 KG/M2 | HEART RATE: 87 BPM | DIASTOLIC BLOOD PRESSURE: 100 MMHG

## 2023-05-26 DIAGNOSIS — I25.84 ATHEROSCLEROTIC HEART DISEASE OF NATIVE CORONARY ARTERY W/OUT ANGINA PECTORIS: ICD-10-CM

## 2023-05-26 DIAGNOSIS — J43.2 CENTRILOBULAR EMPHYSEMA: ICD-10-CM

## 2023-05-26 DIAGNOSIS — I25.10 ATHEROSCLEROTIC HEART DISEASE OF NATIVE CORONARY ARTERY W/OUT ANGINA PECTORIS: ICD-10-CM

## 2023-05-26 PROCEDURE — G0444 DEPRESSION SCREEN ANNUAL: CPT | Mod: 59

## 2023-05-26 PROCEDURE — G0439: CPT

## 2023-05-26 PROCEDURE — 99214 OFFICE O/P EST MOD 30 MIN: CPT | Mod: 25

## 2023-05-29 ENCOUNTER — RESULT CHARGE (OUTPATIENT)
Age: 83
End: 2023-05-29

## 2023-05-31 PROBLEM — J43.2 CENTRILOBULAR EMPHYSEMA: Status: ACTIVE | Noted: 2021-01-26

## 2023-05-31 NOTE — ASSESSMENT
[FreeTextEntry1] : PMHx - former smoker, HLD, Osteoporosis (on Prolia)\par \par Cardiology - Dr. Nuñez \par Rheumatology - Dr. Thomas \par \par #?COPD \par extensive smoking hx - however recent PFTs without sign of obstruction\par \par #PMR\par recent dx of PMR\par on steroid taper with improvement in symptoms\par Dr. Betty Thomas \par \par #Osteoporosis \par on prolia since 11/2011\par Dr. Brian Walls \par \par #Adenocarcinoma of L lung \par s/p Bronch, Left VATS, lingulectomy with hilar and mediastinal lymph node sampling on 8/23/22 with negative margins \par Recent CT without evidence of recurrence\par Follows with Dr. Baker

## 2023-05-31 NOTE — HEALTH RISK ASSESSMENT
[Good] : ~his/her~  mood as  good [Monthly or less (1 pt)] : Monthly or less (1 point) [1 or 2 (0 pts)] : 1 or 2 (0 points) [Never (0 pts)] : Never (0 points) [No] : In the past 12 months have you used drugs other than those required for medical reasons? No [No falls in past year] : Patient reported no falls in the past year [0] : 2) Feeling down, depressed, or hopeless: Not at all (0) [PHQ-2 Negative - No further assessment needed] : PHQ-2 Negative - No further assessment needed [Patient reported mammogram was normal] : Patient reported mammogram was normal [Patient reported bone density results were abnormal] : Patient reported bone density results were abnormal [Patient declined colonoscopy] : Patient declined colonoscopy [None] : None [Alone] : lives alone [Retired] : retired [Single] : single [# Of Children ___] : has [unfilled] children [Sexually Active] : sexually active [Feels Safe at Home] : Feels safe at home [With Patient/Caregiver] : , with patient/caregiver [Designated Healthcare Proxy] : Designated healthcare proxy [Name: ___] : Health Care Proxy's Name: [unfilled]  [Relationship: ___] : Relationship: [unfilled] [Former] : Former [0-4] : 0-4 [> 15 Years] : > 15 Years [de-identified] : active  [de-identified] : balanced varied diet without restrictions  [[id number]] : 0 [Language] : denies difficulty with language [Handling Complex Tasks] : denies difficulty handling complex tasks [Reports changes in hearing] : Reports no changes in hearing [Reports changes in vision] : Reports no changes in vision [Reports changes in dental health] : Reports no changes in dental health [MammogramDate] : 9/2022 [BoneDensityDate] : 3/2022 [BoneDensityComments] : on prolia  [ColonoscopyComments] : declined colonsocopy, cologard  [AdvancecareDate] : 5/26/2023

## 2023-05-31 NOTE — HISTORY OF PRESENT ILLNESS
[FreeTextEntry1] : Patient presents for comprehensive medical evaluation today.  [de-identified] : Patient presents for comprehensive medical evaluation today. \par Patient has been in good overall health this past year and has no active complaints.\par All data, labs, consult notes and studies reviewed. \par \par

## 2023-06-01 ENCOUNTER — APPOINTMENT (OUTPATIENT)
Dept: CT IMAGING | Facility: CLINIC | Age: 83
End: 2023-06-01
Payer: MEDICARE

## 2023-06-01 ENCOUNTER — OUTPATIENT (OUTPATIENT)
Dept: OUTPATIENT SERVICES | Facility: HOSPITAL | Age: 83
LOS: 1 days | End: 2023-06-01
Payer: MEDICARE

## 2023-06-01 DIAGNOSIS — Z98.890 OTHER SPECIFIED POSTPROCEDURAL STATES: Chronic | ICD-10-CM

## 2023-06-01 DIAGNOSIS — Z90.49 ACQUIRED ABSENCE OF OTHER SPECIFIED PARTS OF DIGESTIVE TRACT: Chronic | ICD-10-CM

## 2023-06-01 DIAGNOSIS — C34.92 MALIGNANT NEOPLASM OF UNSPECIFIED PART OF LEFT BRONCHUS OR LUNG: ICD-10-CM

## 2023-06-01 DIAGNOSIS — Z98.49 CATARACT EXTRACTION STATUS, UNSPECIFIED EYE: Chronic | ICD-10-CM

## 2023-06-01 PROCEDURE — 71250 CT THORAX DX C-: CPT

## 2023-06-01 PROCEDURE — 71250 CT THORAX DX C-: CPT | Mod: 26,MH

## 2023-06-05 ENCOUNTER — APPOINTMENT (OUTPATIENT)
Dept: RHEUMATOLOGY | Facility: CLINIC | Age: 83
End: 2023-06-05
Payer: MEDICARE

## 2023-06-05 VITALS
TEMPERATURE: 97.1 F | HEIGHT: 65 IN | RESPIRATION RATE: 16 BRPM | OXYGEN SATURATION: 98 % | WEIGHT: 170 LBS | SYSTOLIC BLOOD PRESSURE: 145 MMHG | BODY MASS INDEX: 28.32 KG/M2 | DIASTOLIC BLOOD PRESSURE: 90 MMHG | HEART RATE: 66 BPM

## 2023-06-05 DIAGNOSIS — K59.00 CONSTIPATION, UNSPECIFIED: ICD-10-CM

## 2023-06-05 PROCEDURE — 99213 OFFICE O/P EST LOW 20 MIN: CPT

## 2023-06-05 NOTE — HISTORY OF PRESENT ILLNESS
[FreeTextEntry1] : Patient with history of PMR on steroids presents for a follow up visit today. \par She is doing well and offers no complaints of proximal myalgias/arthralgias\par no headaches, no vision change, no jaw claudication\par takes prednisone at night as doesn't want to take in the morning with her vitamins\par having hard time sleep and reports constipation/increase in appetite since being on steroids. \par \par \par

## 2023-06-05 NOTE — PHYSICAL EXAM
[General Appearance - Alert] : alert [General Appearance - In No Acute Distress] : in no acute distress [FreeTextEntry1] : no proximal mm tenderenss

## 2023-06-07 ENCOUNTER — APPOINTMENT (OUTPATIENT)
Dept: THORACIC SURGERY | Facility: CLINIC | Age: 83
End: 2023-06-07
Payer: MEDICARE

## 2023-06-07 VITALS
WEIGHT: 170 LBS | OXYGEN SATURATION: 97 % | RESPIRATION RATE: 16 BRPM | DIASTOLIC BLOOD PRESSURE: 84 MMHG | HEIGHT: 66 IN | HEART RATE: 68 BPM | SYSTOLIC BLOOD PRESSURE: 137 MMHG | BODY MASS INDEX: 27.32 KG/M2

## 2023-06-07 PROCEDURE — 99214 OFFICE O/P EST MOD 30 MIN: CPT

## 2023-06-08 NOTE — ASSESSMENT
[FreeTextEntry1] : Ms. DUSTIN NEIL, 82 year old female,former smoker (1 PPD x 30 years; Quit 1990), w/ hx of COPD, HLD,  Osteoporosis (Last Prolia injection March, 2022), Dilated thoracic aorta and pulmonary nodules. Found to have lobulated nodule in the lingula. \par \par Now, s/p Bronch, Left VATS, lingulectomy with  hilar and mediastinal lymph node sampling on 8/23/22. 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 Grade 3 (>20% complex or micropapillary); 1.3 cm; All margins and LN (0/19) negative; pT1b pN0\par \par Here today for 6 months follow up.\par \par I have independently reviewed the medical records and imaging at the time of this office consultation. CT Chest reviewed with patient, stable findings. I recommend she returns to clinic in 6 months with repeat CT Chest without contrast. Continue follow up with pulm, PCP, and cards. She is agreeable.\par \par Recommendations reviewed with patient during this office visit, and all questions answered; Patient instructed on the importance of follow up and verbalizes understanding. \par \par \par I, TENZIN Carreon, personally performed the evaluation and management (E/M) services for this established patient. That E/M includes conducting the examination, assessing all new/exacerbated conditions, and establishing a new plan of care. Today, my ACP, Torin Dasilva NP, was here to observe my evaluation and management services for this new problem/exacerbated condition to be followed going forward.\par \par  \par \par

## 2023-06-08 NOTE — DATA REVIEWED
[FreeTextEntry1] : I have independently reviewed the following: \par PFTs on 01/12/2023\par CT Chest on 06/01/2023

## 2023-06-08 NOTE — HISTORY OF PRESENT ILLNESS
[FreeTextEntry1] : Ms. DUSTIN NEIL, 82 year old female,former smoker (1 PPD x 30 years; Quit 1990), w/ hx of COPD, HLD Osteoporosis (Last Prolia injection March, 2022), Dilated thoracic aorta and pulmonary nodules. Found to have lobulated nodule in the lingula. \par \par Now, s/p Bronch, Left VATS, lingulectomy with hilar and mediastinal lymph node sampling on 8/23/22. 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 Grade 3 (>20% complex or micropapillary); 1.3 cm; All margins and LN (0/19) negative; pT1b pN0\par \par CT Chest on 11/28/2022:\par - Status post lingulectomy. \par - Resolved small bilateral pleural effusions and left pneumothorax. \par - Mild biapical scarring is noted. \par - Centrilobular emphysema is present.\par - Small hiatal hernia.\par \par PFTs on 01/12/2023: FVC 2.96, 102%; FEV1 2.07, 96%; DLCO 12.6, 62%\par \par CT Chest on 06/01/2023: \par - Lingulectomy with stable postoperative changes. \par - Biapical scarring. \par - Dilated ascending thoracic aorta measuring 4.3 cm unchanged.\par \par \par Today, he is here for 6 month follow up. Overall, she reports to be feeling well. Denies any chest pain, shortness of breath, cough, or hemoptysis.

## 2023-06-19 ENCOUNTER — APPOINTMENT (OUTPATIENT)
Dept: CARDIOLOGY | Facility: CLINIC | Age: 83
End: 2023-06-19
Payer: MEDICARE

## 2023-06-19 PROCEDURE — 93306 TTE W/DOPPLER COMPLETE: CPT

## 2023-06-26 ENCOUNTER — RX RENEWAL (OUTPATIENT)
Age: 83
End: 2023-06-26

## 2023-07-14 ENCOUNTER — APPOINTMENT (OUTPATIENT)
Dept: RHEUMATOLOGY | Facility: CLINIC | Age: 83
End: 2023-07-14
Payer: MEDICARE

## 2023-07-14 VITALS
HEIGHT: 66 IN | BODY MASS INDEX: 27.32 KG/M2 | HEART RATE: 77 BPM | WEIGHT: 170 LBS | DIASTOLIC BLOOD PRESSURE: 86 MMHG | OXYGEN SATURATION: 97 % | SYSTOLIC BLOOD PRESSURE: 131 MMHG

## 2023-07-14 DIAGNOSIS — R21 RASH AND OTHER NONSPECIFIC SKIN ERUPTION: ICD-10-CM

## 2023-07-14 DIAGNOSIS — G47.00 INSOMNIA, UNSPECIFIED: ICD-10-CM

## 2023-07-14 PROCEDURE — 99213 OFFICE O/P EST LOW 20 MIN: CPT

## 2023-07-14 NOTE — PHYSICAL EXAM
[General Appearance - Alert] : alert [General Appearance - In No Acute Distress] : in no acute distress [Abnormal Walk] : normal gait [Nail Clubbing] : no clubbing  or cyanosis of the fingernails [Musculoskeletal - Swelling] : no joint swelling seen [Motor Tone] : muscle strength and tone were normal [Oriented To Time, Place, And Person] : oriented to person, place, and time [Impaired Insight] : insight and judgment were intact [Affect] : the affect was normal [FreeTextEntry1] : macular rash  b/l arms

## 2023-07-14 NOTE — HISTORY OF PRESENT ILLNESS
[FreeTextEntry1] : 8 mg now \par trouble sleeping \par skin itching\par no headaches, no vision change, no jaw claudication\par

## 2023-07-14 NOTE — ASSESSMENT
[FreeTextEntry1] : patient is with PMR on steroids \par does not tolerate steroids du to insomnia and weight gain. importance of slow taper discussed. \par taper prednisone by 1 mg very two weeks\par rash--f/u with dermatology \par OV 6 weeks, sooner PRN \par \par

## 2023-07-28 ENCOUNTER — NON-APPOINTMENT (OUTPATIENT)
Age: 83
End: 2023-07-28

## 2023-07-28 ENCOUNTER — APPOINTMENT (OUTPATIENT)
Dept: OPHTHALMOLOGY | Facility: CLINIC | Age: 83
End: 2023-07-28
Payer: MEDICARE

## 2023-07-28 PROCEDURE — 92060 SENSORIMOTOR EXAMINATION: CPT

## 2023-07-28 PROCEDURE — 92015 DETERMINE REFRACTIVE STATE: CPT

## 2023-07-28 PROCEDURE — 99204 OFFICE O/P NEW MOD 45 MIN: CPT

## 2023-08-25 ENCOUNTER — APPOINTMENT (OUTPATIENT)
Dept: RHEUMATOLOGY | Facility: CLINIC | Age: 83
End: 2023-08-25
Payer: MEDICARE

## 2023-08-25 VITALS
SYSTOLIC BLOOD PRESSURE: 128 MMHG | HEIGHT: 66 IN | WEIGHT: 170 LBS | BODY MASS INDEX: 27.32 KG/M2 | HEART RATE: 73 BPM | OXYGEN SATURATION: 96 % | DIASTOLIC BLOOD PRESSURE: 85 MMHG

## 2023-08-25 DIAGNOSIS — R25.2 CRAMP AND SPASM: ICD-10-CM

## 2023-08-25 DIAGNOSIS — R51.9 HEADACHE, UNSPECIFIED: ICD-10-CM

## 2023-08-25 PROCEDURE — 99214 OFFICE O/P EST MOD 30 MIN: CPT

## 2023-08-26 LAB
ALBUMIN SERPL ELPH-MCNC: 4.3 G/DL
ALP BLD-CCNC: 49 U/L
ALT SERPL-CCNC: 14 U/L
ANION GAP SERPL CALC-SCNC: 12 MMOL/L
AST SERPL-CCNC: 20 U/L
BILIRUB SERPL-MCNC: 0.4 MG/DL
BUN SERPL-MCNC: 27 MG/DL
CALCIUM SERPL-MCNC: 9.3 MG/DL
CHLORIDE SERPL-SCNC: 103 MMOL/L
CO2 SERPL-SCNC: 24 MMOL/L
CREAT SERPL-MCNC: 0.96 MG/DL
CRP SERPL-MCNC: 3 MG/L
EGFR: 59 ML/MIN/1.73M2
ERYTHROCYTE [SEDIMENTATION RATE] IN BLOOD BY WESTERGREN METHOD: 5 MM/HR
POTASSIUM SERPL-SCNC: 4.4 MMOL/L
PROT SERPL-MCNC: 6.5 G/DL
SODIUM SERPL-SCNC: 140 MMOL/L

## 2023-08-26 NOTE — PHYSICAL EXAM
[General Appearance - Alert] : alert [General Appearance - In No Acute Distress] : in no acute distress [Full Pulse] : the pedal pulses are present [Edema] : there was no peripheral edema [Abnormal Walk] : normal gait [Nail Clubbing] : no clubbing  or cyanosis of the fingernails [Musculoskeletal - Swelling] : no joint swelling seen [Motor Tone] : muscle strength and tone were normal [Oriented To Time, Place, And Person] : oriented to person, place, and time

## 2023-08-26 NOTE — ASSESSMENT
[FreeTextEntry1] : 1. temporal headaches, intermittent--low suspicion for GCA; check inflammatory markers.  2. PMR  continue with slow steroids taper 3. Muscle cramps--check electrolytes, Mg

## 2023-08-26 NOTE — HISTORY OF PRESENT ILLNESS
[FreeTextEntry1] : s/p neuro-ophth evaluation; prescribed refractors. doing better  intermittent temporal headaches, 1-2 times per week, improves with Tylenol  no vision change, no jaw claudication  slight pain on the left when sleeping on the effected side no symmetrical proximal myalgias.   intermittent LE muscle cramps

## 2023-09-03 LAB — MAGNESIUM RBC-MCNC: 5.5 MG/DL

## 2023-09-22 ENCOUNTER — RX RENEWAL (OUTPATIENT)
Age: 83
End: 2023-09-22

## 2023-09-25 ENCOUNTER — APPOINTMENT (OUTPATIENT)
Dept: ENDOCRINOLOGY | Facility: CLINIC | Age: 83
End: 2023-09-25
Payer: MEDICARE

## 2023-09-25 PROCEDURE — 96372 THER/PROPH/DIAG INJ SC/IM: CPT

## 2023-09-25 RX ORDER — DENOSUMAB 60 MG/ML
60 INJECTION SUBCUTANEOUS
Qty: 1 | Refills: 0 | Status: COMPLETED | OUTPATIENT
Start: 2023-09-21

## 2023-10-27 ENCOUNTER — APPOINTMENT (OUTPATIENT)
Dept: RHEUMATOLOGY | Facility: CLINIC | Age: 83
End: 2023-10-27
Payer: MEDICARE

## 2023-10-27 VITALS
HEIGHT: 66 IN | DIASTOLIC BLOOD PRESSURE: 83 MMHG | OXYGEN SATURATION: 96 % | HEART RATE: 70 BPM | SYSTOLIC BLOOD PRESSURE: 153 MMHG | WEIGHT: 170 LBS | BODY MASS INDEX: 27.32 KG/M2

## 2023-10-27 PROCEDURE — 99213 OFFICE O/P EST LOW 20 MIN: CPT

## 2023-11-14 ENCOUNTER — NON-APPOINTMENT (OUTPATIENT)
Age: 83
End: 2023-11-14

## 2023-11-14 ENCOUNTER — APPOINTMENT (OUTPATIENT)
Dept: CARDIOLOGY | Facility: CLINIC | Age: 83
End: 2023-11-14
Payer: MEDICARE

## 2023-11-14 VITALS
OXYGEN SATURATION: 97 % | WEIGHT: 172 LBS | SYSTOLIC BLOOD PRESSURE: 142 MMHG | DIASTOLIC BLOOD PRESSURE: 86 MMHG | HEART RATE: 66 BPM | BODY MASS INDEX: 27.76 KG/M2

## 2023-11-14 VITALS — SYSTOLIC BLOOD PRESSURE: 126 MMHG | DIASTOLIC BLOOD PRESSURE: 82 MMHG

## 2023-11-14 VITALS — SYSTOLIC BLOOD PRESSURE: 142 MMHG | DIASTOLIC BLOOD PRESSURE: 84 MMHG

## 2023-11-14 PROCEDURE — 93000 ELECTROCARDIOGRAM COMPLETE: CPT

## 2023-11-14 PROCEDURE — 99214 OFFICE O/P EST MOD 30 MIN: CPT

## 2023-11-14 PROCEDURE — 36415 COLL VENOUS BLD VENIPUNCTURE: CPT

## 2023-11-18 ENCOUNTER — NON-APPOINTMENT (OUTPATIENT)
Age: 83
End: 2023-11-18

## 2023-12-06 ENCOUNTER — NON-APPOINTMENT (OUTPATIENT)
Age: 83
End: 2023-12-06

## 2023-12-09 ENCOUNTER — APPOINTMENT (OUTPATIENT)
Dept: CT IMAGING | Facility: CLINIC | Age: 83
End: 2023-12-09
Payer: MEDICARE

## 2023-12-09 ENCOUNTER — OUTPATIENT (OUTPATIENT)
Dept: OUTPATIENT SERVICES | Facility: HOSPITAL | Age: 83
LOS: 1 days | End: 2023-12-09
Payer: MEDICARE

## 2023-12-09 DIAGNOSIS — Z90.49 ACQUIRED ABSENCE OF OTHER SPECIFIED PARTS OF DIGESTIVE TRACT: Chronic | ICD-10-CM

## 2023-12-09 DIAGNOSIS — C34.92 MALIGNANT NEOPLASM OF UNSPECIFIED PART OF LEFT BRONCHUS OR LUNG: ICD-10-CM

## 2023-12-09 DIAGNOSIS — Z98.49 CATARACT EXTRACTION STATUS, UNSPECIFIED EYE: Chronic | ICD-10-CM

## 2023-12-09 DIAGNOSIS — Z98.890 OTHER SPECIFIED POSTPROCEDURAL STATES: Chronic | ICD-10-CM

## 2023-12-09 PROCEDURE — 71250 CT THORAX DX C-: CPT

## 2023-12-09 PROCEDURE — 71250 CT THORAX DX C-: CPT | Mod: 26,MH

## 2023-12-15 ENCOUNTER — APPOINTMENT (OUTPATIENT)
Dept: RHEUMATOLOGY | Facility: CLINIC | Age: 83
End: 2023-12-15
Payer: MEDICARE

## 2023-12-15 VITALS
OXYGEN SATURATION: 96 % | HEIGHT: 66 IN | WEIGHT: 173 LBS | DIASTOLIC BLOOD PRESSURE: 88 MMHG | HEART RATE: 64 BPM | SYSTOLIC BLOOD PRESSURE: 156 MMHG | BODY MASS INDEX: 27.8 KG/M2

## 2023-12-15 VITALS — SYSTOLIC BLOOD PRESSURE: 134 MMHG | DIASTOLIC BLOOD PRESSURE: 82 MMHG

## 2023-12-15 DIAGNOSIS — M79.10 MYALGIA, UNSPECIFIED SITE: ICD-10-CM

## 2023-12-15 PROCEDURE — 99213 OFFICE O/P EST LOW 20 MIN: CPT

## 2023-12-15 NOTE — PHYSICAL EXAM
[General Appearance - Alert] : alert [General Appearance - In No Acute Distress] : in no acute distress [Edema] : there was no peripheral edema [Full Pulse] : the pedal pulses are present [FreeTextEntry1] : Restricted range of motion bilateral hip on full external rotation. [Oriented To Time, Place, And Person] : oriented to person, place, and time [Impaired Insight] : insight and judgment were intact [Affect] : the affect was normal

## 2023-12-15 NOTE — HISTORY OF PRESENT ILLNESS
[FreeTextEntry1] : Patient presents for follow-up visit today.  She is taking 2 mg of prednisone a day and she has not had a recurrence of proximal muscle pain and stiffness.  She does complain of intermittent pain in the right upper back.  Occasionally this pain wakes her from sleep.  She takes Tylenol and pain goes away.  Patient is also complaining of intermittent pain in bilateral hips which is brought on by movement.  Tylenol helps with this pain as well.

## 2023-12-15 NOTE — ASSESSMENT
[FreeTextEntry1] : Pain is not reproducible on exam at this time.  Described pain appears to be muscular.  Pain in the hips is likely related to underlying osteoarthritis of the hips. Recommend to continue Tylenol as needed for pain. Continue with prednisone 2 mg/day for total of 1 month and then decrease the dose of prednisone to 1 mg/day. Office visit 6 weeks, sooner as needed.

## 2023-12-21 RX ORDER — SIMVASTATIN 20 MG/1
20 TABLET, FILM COATED ORAL
Qty: 90 | Refills: 3 | Status: ACTIVE | COMMUNITY
Start: 2017-02-02 | End: 1900-01-01

## 2023-12-26 ENCOUNTER — APPOINTMENT (OUTPATIENT)
Dept: THORACIC SURGERY | Facility: CLINIC | Age: 83
End: 2023-12-26
Payer: MEDICARE

## 2023-12-26 DIAGNOSIS — R91.8 OTHER NONSPECIFIC ABNORMAL FINDING OF LUNG FIELD: ICD-10-CM

## 2023-12-26 PROCEDURE — 99442: CPT | Mod: 95

## 2023-12-26 NOTE — HISTORY OF PRESENT ILLNESS
[FreeTextEntry1] : Ms. DUSTIN NEIL, 83 year old female,former smoker (1 PPD x 30 years; Quit 1990), w/ hx of COPD, HLD Osteoporosis (Last Prolia injection March, 2022), Dilated thoracic aorta and pulmonary nodules. Found to have lobulated nodule in the lingula.   Now, s/p Bronch, Left VATS, lingulectomy with hilar and mediastinal lymph node sampling on 8/23/22. 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 Grade 3 (>20% complex or micropapillary); 1.3 cm; All margins and LN (0/19) negative; pT1b pN0  CT Chest on 11/28/2022: - Status post lingulectomy.  - Resolved small bilateral pleural effusions and left pneumothorax.  - Mild biapical scarring is noted.  - Centrilobular emphysema is present. - Small hiatal hernia.  PFTs on 01/12/2023: FVC 2.96, 102%; FEV1 2.07, 96%; DLCO 12.6, 62%  CT Chest on 06/01/2023:  - Lingulectomy with stable postoperative changes.  - Biapical scarring.  - Dilated ascending thoracic aorta measuring 4.3 cm unchanged.  CT Chest on 12/09/2023: - Status post wedge resection in the left upper lobe. - 0.8 cm nodular opacity in the peripheral portion of the left upper lobe is unchanged when compared to previous exam.  - Scarring at both lung apices is also unchanged when compared to previous exam.   Today, he is here for 6 month follow up via tele visit. Overall, she reports to be feeling well. Denies any chest pain, shortness of breath, cough, or hemoptysis.

## 2023-12-26 NOTE — ASSESSMENT
[FreeTextEntry1] : Ms. DUSTIN NEIL, 83 year old female,former smoker (1 PPD x 30 years; Quit 1990), w/ hx of COPD, HLD Osteoporosis (Last Prolia injection March, 2022), Dilated thoracic aorta and pulmonary nodules. Found to have lobulated nodule in the lingula.   Now, s/p Bronch, Left VATS, lingulectomy with hilar and mediastinal lymph node sampling on 8/23/22. 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 Grade 3 (>20% complex or micropapillary); 1.3 cm; All margins and LN (0/19) negative; pT1b pN0  She presents today for follow up with imaging via tele visit.   I have independently reviewed the medical records and imaging at the time of this office consultation. CT Chest reviewed with patient, stable scan. I recommend she returns to clinic in 6 months with repeat CT Chest without contrast. She is agreeable.   Recommendations reviewed with patient during this office visit, and all questions answered; Patient instructed on the importance of follow up and verbalizes understanding.    I, TENZIN Carreon, personally performed the evaluation and management (E/M) services for this established patient. That E/M includes conducting the examination, assessing all new/exacerbated conditions, and establishing a new plan of care. Today, my ACP, Torin Dasilva NP, was here to observe my evaluation and management services for this new problem/exacerbated condition to be followed going forward.

## 2023-12-26 NOTE — REASON FOR VISIT
[Home] : at home, [unfilled] , at the time of the visit. [Medical Office: ([facility])___] : at the medical office located in  [Verbal consent obtained from patient] : the patient, [unfilled]

## 2023-12-29 NOTE — HISTORY OF PRESENT ILLNESS
[FreeTextEntry1] : 05/08/2023 - Office visit: PCP: Dr. Calli Melara Past medical history notable for CAD, aortic root dilatation, tricuspid regurgitation, pulmonary hypertension, hyperlipidemia, adeno CA of lung status post left VATS, COPD, PMR. She notes occasional mild shortness of breath if she goes up steps; this has been chronic and stable for 2 years. She denies chest pain and palpitations.  The patient has had the following workup:  CT (1/16/2021-addendum): Mid ascending aorta 4.2 cm CT (11/28/2022): Mid ascending aorta 4.3 cm. PET/CT (4/29/2022): Ascending aorta at the level of the main pulmonary artery 4.2 cm, unchanged compared to prior PET/CT 4/13/2021. TTE (6/9/2022): LVEF 64% aortic root 4.0 estimated pulmonary artery systolic pressure 49 mmHg.  Mild AR/MR.  Severe TR. Nuclear stress test (7/25/2022): Normal study  11/14/2023 - Office visit: Very rare shortness of breath going up steps. Denies chest pain and palpitations.

## 2023-12-29 NOTE — DISCUSSION/SUMMARY
[FreeTextEntry1] : PLAN: Continue simvastatin 20 daily OV 6 months  VITAL SIGNS: BP: 122/74  PHYSICAL EXAMINATION:  Constitutional: well developed/well nourished, no acute distress Eyes: no xanthelasma ENT: no oral cyanosis Neck: without jugular venous distention, without carotid bruits Cardiovascular: regular rhythm, normal S1 and S2, grade 1/6 systolic murmur Respiratory: no respiratory distress, lungs clear to auscultation bilaterally Abdomen: soft, non-tender, no abdominal mass palpated, no hepatosplenomegaly, bowel sounds normal Extremities: without clubbing, cyanosis, edema, or tenderness Musculoskeletal: gait sufficient for exercise testing Neurologic: alert and oriented X 3 Psych: affect normal

## 2023-12-29 NOTE — HISTORY OF PRESENT ILLNESS
[FreeTextEntry1] : 05/08/2023 - Office visit: PCP: Dr. Calli Melara Past medical history notable for CAD, aortic root dilatation, tricuspid regurgitation, pulmonary hypertension, hyperlipidemia, adeno CA of lung status post left VATS, COPD, PMR. She notes occasional mild shortness of breath if she goes up steps; this has been chronic and stable for 2 years. She denies chest pain and palpitations.  The patient has had the following workup:  CT (1/16/2021-addendum): Mid ascending aorta 4.2 cm CT (11/28/2022): Mid ascending aorta 4.3 cm. PET/CT (4/29/2022): Ascending aorta at the level of the main pulmonary artery 4.2 cm, unchanged compared to prior PET/CT 4/13/2021. TTE (6/9/2022): LVEF 64% aortic root 4.0 estimated pulmonary artery systolic pressure 49 mmHg.  Mild AR/MR.  Severe TR. Nuclear stress test (7/25/2022): Normal study

## 2024-02-01 ENCOUNTER — APPOINTMENT (OUTPATIENT)
Dept: RHEUMATOLOGY | Facility: CLINIC | Age: 84
End: 2024-02-01
Payer: MEDICARE

## 2024-02-01 VITALS
DIASTOLIC BLOOD PRESSURE: 77 MMHG | WEIGHT: 173 LBS | HEART RATE: 73 BPM | BODY MASS INDEX: 27.8 KG/M2 | HEIGHT: 66 IN | OXYGEN SATURATION: 98 % | SYSTOLIC BLOOD PRESSURE: 116 MMHG

## 2024-02-01 DIAGNOSIS — G89.29 CERVICALGIA: ICD-10-CM

## 2024-02-01 DIAGNOSIS — G89.29 DORSALGIA, UNSPECIFIED: ICD-10-CM

## 2024-02-01 DIAGNOSIS — M54.9 DORSALGIA, UNSPECIFIED: ICD-10-CM

## 2024-02-01 DIAGNOSIS — M35.3 POLYMYALGIA RHEUMATICA: ICD-10-CM

## 2024-02-01 DIAGNOSIS — M54.2 CERVICALGIA: ICD-10-CM

## 2024-02-01 DIAGNOSIS — M54.50 LOW BACK PAIN, UNSPECIFIED: ICD-10-CM

## 2024-02-01 PROCEDURE — 99213 OFFICE O/P EST LOW 20 MIN: CPT

## 2024-02-01 NOTE — ASSESSMENT
[FreeTextEntry1] : change prednisone to 1 mg every other day, then stop  f/u if recurrent of symptoms  encouraged f/u with neurology for evaluation of poor balance  Tylenol as needed for lower back and cervical pain which is likely secondary to DJD  declines PT   OV PRN

## 2024-02-01 NOTE — HISTORY OF PRESENT ILLNESS
[FreeTextEntry1] : Patient presents for follow-up visit today.  She is taking 1 mg of prednisone a day and she has not had a recurrence of proximal muscle pain and stiffness.  She does complain of intermittent pain in the right upper back that is felt across the upper back.   She takes Tylenol and pain goes away.  Patient is also complaining of intermittent pain in bilateral hips which is brought on by movement.  Tylenol helps with this pain as well.  She reports poor balance and double vision (when she does not wear her Rx glassed) no headaches, no vision change, no jaw claudication

## 2024-03-07 ENCOUNTER — APPOINTMENT (OUTPATIENT)
Dept: CARDIOLOGY | Facility: CLINIC | Age: 84
End: 2024-03-07
Payer: MEDICARE

## 2024-03-07 ENCOUNTER — NON-APPOINTMENT (OUTPATIENT)
Age: 84
End: 2024-03-07

## 2024-03-07 VITALS
SYSTOLIC BLOOD PRESSURE: 122 MMHG | OXYGEN SATURATION: 100 % | DIASTOLIC BLOOD PRESSURE: 84 MMHG | BODY MASS INDEX: 28.73 KG/M2 | WEIGHT: 178 LBS | HEART RATE: 75 BPM

## 2024-03-07 DIAGNOSIS — I27.20 PULMONARY HYPERTENSION, UNSPECIFIED: ICD-10-CM

## 2024-03-07 DIAGNOSIS — I77.810 THORACIC AORTIC ECTASIA: ICD-10-CM

## 2024-03-07 DIAGNOSIS — R06.09 OTHER FORMS OF DYSPNEA: ICD-10-CM

## 2024-03-07 DIAGNOSIS — R03.0 ELEVATED BLOOD-PRESSURE READING, W/OUT DIAGNOSIS OF HYPERTENSION: ICD-10-CM

## 2024-03-07 PROCEDURE — 93000 ELECTROCARDIOGRAM COMPLETE: CPT

## 2024-03-07 PROCEDURE — 99214 OFFICE O/P EST MOD 30 MIN: CPT

## 2024-03-07 PROCEDURE — G2211 COMPLEX E/M VISIT ADD ON: CPT

## 2024-03-10 ENCOUNTER — NON-APPOINTMENT (OUTPATIENT)
Age: 84
End: 2024-03-10

## 2024-03-21 ENCOUNTER — APPOINTMENT (OUTPATIENT)
Dept: INTERNAL MEDICINE | Facility: CLINIC | Age: 84
End: 2024-03-21

## 2024-03-22 ENCOUNTER — APPOINTMENT (OUTPATIENT)
Dept: OBGYN | Facility: CLINIC | Age: 84
End: 2024-03-22
Payer: MEDICARE

## 2024-03-22 VITALS
WEIGHT: 178 LBS | DIASTOLIC BLOOD PRESSURE: 82 MMHG | HEIGHT: 66 IN | BODY MASS INDEX: 28.61 KG/M2 | SYSTOLIC BLOOD PRESSURE: 146 MMHG

## 2024-03-22 DIAGNOSIS — Z12.31 ENCOUNTER FOR SCREENING MAMMOGRAM FOR MALIGNANT NEOPLASM OF BREAST: ICD-10-CM

## 2024-03-22 DIAGNOSIS — Z01.419 ENCOUNTER FOR GYNECOLOGICAL EXAMINATION (GENERAL) (ROUTINE) W/OUT ABNORMAL FINDINGS: ICD-10-CM

## 2024-03-22 PROCEDURE — G0101: CPT

## 2024-03-22 NOTE — END OF VISIT
[FreeTextEntry3] : I, Tory Brooks, acted solely as a scribe for Dr. Celestina Mays, on 03/22/2024.   All medical record entries made by the scribe were at my, Dr. Celestina Mays., direction and personally dictated by me on 03/22/2024. I have personally reviewed the chart and agree that the record accurately reflects my personal performance of the history, physical exam, assessment and plan.

## 2024-03-22 NOTE — PLAN
[FreeTextEntry1] : DUSTIN NEIL is a 83 year old presenting for annual GYN exam. -discussed pap option; pt declines  -referred for mammo due in Oct.  F/U 2 yrs per Medicare guidelines

## 2024-03-22 NOTE — HISTORY OF PRESENT ILLNESS
[FreeTextEntry1] : DUSTIN NEIL is a 83 year old G0 presenting for annual GYN exam. Last seen feb 2022. Benign mammo done Oct 2023. Neg pap/HPV in 2015. Pt had surgery about a year ago for a malignant lung mass. Doing well, has no complaints.

## 2024-03-28 ENCOUNTER — RESULT CHARGE (OUTPATIENT)
Age: 84
End: 2024-03-28

## 2024-03-29 ENCOUNTER — APPOINTMENT (OUTPATIENT)
Dept: ENDOCRINOLOGY | Facility: CLINIC | Age: 84
End: 2024-03-29
Payer: MEDICARE

## 2024-03-29 ENCOUNTER — OUTPATIENT (OUTPATIENT)
Dept: OUTPATIENT SERVICES | Facility: HOSPITAL | Age: 84
LOS: 1 days | End: 2024-03-29
Payer: MEDICARE

## 2024-03-29 ENCOUNTER — APPOINTMENT (OUTPATIENT)
Dept: RADIOLOGY | Facility: CLINIC | Age: 84
End: 2024-03-29
Payer: MEDICARE

## 2024-03-29 ENCOUNTER — RESULT REVIEW (OUTPATIENT)
Age: 84
End: 2024-03-29

## 2024-03-29 VITALS
BODY MASS INDEX: 29.23 KG/M2 | WEIGHT: 177 LBS | SYSTOLIC BLOOD PRESSURE: 120 MMHG | HEART RATE: 80 BPM | DIASTOLIC BLOOD PRESSURE: 78 MMHG | OXYGEN SATURATION: 98 %

## 2024-03-29 VITALS — WEIGHT: 177 LBS | HEIGHT: 65.25 IN | BODY MASS INDEX: 29.14 KG/M2

## 2024-03-29 DIAGNOSIS — M81.0 AGE-RELATED OSTEOPOROSIS W/OUT CURRENT PATHOLOGICAL FRACTURE: ICD-10-CM

## 2024-03-29 DIAGNOSIS — Z90.49 ACQUIRED ABSENCE OF OTHER SPECIFIED PARTS OF DIGESTIVE TRACT: Chronic | ICD-10-CM

## 2024-03-29 DIAGNOSIS — M81.0 AGE-RELATED OSTEOPOROSIS WITHOUT CURRENT PATHOLOGICAL FRACTURE: ICD-10-CM

## 2024-03-29 DIAGNOSIS — Z98.49 CATARACT EXTRACTION STATUS, UNSPECIFIED EYE: Chronic | ICD-10-CM

## 2024-03-29 DIAGNOSIS — Z98.890 OTHER SPECIFIED POSTPROCEDURAL STATES: Chronic | ICD-10-CM

## 2024-03-29 PROBLEM — R03.0 ELEVATED BLOOD PRESSURE READING: Status: ACTIVE | Noted: 2024-03-29

## 2024-03-29 PROBLEM — R06.09 DYSPNEA ON EXERTION: Status: ACTIVE | Noted: 2021-01-26

## 2024-03-29 PROCEDURE — 99213 OFFICE O/P EST LOW 20 MIN: CPT | Mod: 25

## 2024-03-29 PROCEDURE — 73030 X-RAY EXAM OF SHOULDER: CPT | Mod: 26,RT

## 2024-03-29 PROCEDURE — 96372 THER/PROPH/DIAG INJ SC/IM: CPT

## 2024-03-29 PROCEDURE — ZZZZZ: CPT

## 2024-03-29 PROCEDURE — 77080 DXA BONE DENSITY AXIAL: CPT

## 2024-03-29 PROCEDURE — 73030 X-RAY EXAM OF SHOULDER: CPT

## 2024-03-29 RX ORDER — DENOSUMAB 60 MG/ML
60 INJECTION SUBCUTANEOUS
Qty: 1 | Refills: 0 | Status: COMPLETED | OUTPATIENT
Start: 2024-03-29

## 2024-03-29 RX ADMIN — DENOSUMAB 60 MG/ML: 60 INJECTION SUBCUTANEOUS at 00:00

## 2024-03-29 NOTE — END OF VISIT
[FreeTextEntry3] :  This note was written by Vero Borges on (March 29, 2024) acting as a medical scribe for Dr. Walls This note was authored by the medical scribe for me. I have reviewed, edited, and revised the note as needed. I am in agreement with the exam findings, imaging findings, and treatment plan.  Brian Walls MD

## 2024-03-29 NOTE — ASSESSMENT
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   Previously treated with Actonel. Tolerating Prolia since 11/2011. Tolerating well. No thigh pain, no interval fx. Normal Ca. No ONJ. BMD 12/2015 improved. BMD 2016 and 2018 markedly improved vs 2011. BMD 2/2020 indicated stable osteopenia in total hip and improving osteopenia in femoral neck and proximal radius. BMD 3/2022 indicates stable osteopenia in hip and proximal radius. BMD results reviewed w/ pt. BMD 03/2024 indicates stable osteopenia in total hip, fem neck, and prox. radius. BMD results reviewed w/ pt. Continue Prolia, buy and bill.  HLD well controlled on current medication but c/o myalgias. suggest hold lipitor for 4 weeks and assess sx'x  Labs: November 2023 Creatinine: 0.78 Calcium:9.5 Request shoulder x-ray F/u in 6 months

## 2024-03-29 NOTE — PHYSICAL EXAM
[Alert] : alert [Well Nourished] : well nourished [No Acute Distress] : no acute distress [Normal Sclera/Conjunctiva] : normal sclera/conjunctiva [Well Developed] : well developed [EOMI] : extra ocular movement intact [No Proptosis] : no proptosis [Thyroid Not Enlarged] : the thyroid was not enlarged [No Thyroid Nodules] : no palpable thyroid nodules [Clear to Auscultation] : lungs were clear to auscultation bilaterally [Normal Rate] : heart rate was normal [Normal S1, S2] : normal S1 and S2 [Regular Rhythm] : with a regular rhythm [No Edema] : no peripheral edema [Not Distended] : not distended [Not Tender] : non-tender [Normal Bowel Sounds] : normal bowel sounds [Normal Anterior Cervical Nodes] : no anterior cervical lymphadenopathy [Soft] : abdomen soft [Spine Straight] : spine straight [No Spinal Tenderness] : no spinal tenderness [No Stigmata of Cushings Syndrome] : no stigmata of Cushings Syndrome [Normal Gait] : normal gait [No Motor Deficits] : the motor exam was normal [Normal Reflexes] : deep tendon reflexes were 2+ and symmetric [No Tremors] : no tremors [Oriented x3] : oriented to person, place, and time

## 2024-03-29 NOTE — HISTORY OF PRESENT ILLNESS
[FreeTextEntry1] : 05/08/2023 - Office visit: PCP: Dr. Calli Melara Past medical history notable for aortic root dilatation, tricuspid regurgitation, pulmonary hypertension, hyperlipidemia, adeno CA of lung status post left VATS, COPD, PMR. She notes occasional mild shortness of breath if she goes up steps; this has been chronic and stable for 2 years. She denies chest pain and palpitations.  The patient has had the following workup:  CT (1/16/2021-addendum): Mid ascending aorta 4.2 cm CT (11/28/2022): Mid ascending aorta 4.3 cm. PET/CT (4/29/2022): Ascending aorta at the level of the main pulmonary artery 4.2 cm, unchanged compared to prior PET/CT 4/13/2021. TTE (6/9/2022): LVEF 64% aortic root 4.0 estimated pulmonary artery systolic pressure 49 mmHg.  Mild AR/MR.  Severe TR. Nuclear stress test (7/25/2022): Normal study  11/14/2023 - Office visit: Very rare shortness of breath going up steps. Denies chest pain and palpitations.  03/07/2024 - Office visit: Shortness of breath going up and down steps, stable for 4-5 years. Denies chest pain and palpitations.

## 2024-03-29 NOTE — DISCUSSION/SUMMARY
[FreeTextEntry1] : PLAN (ADDITIONAL): Elevated BP - watch diet,  watch salt Hypercholesterolemia - continue simvastatin 20 daily OV and echo 3 months  VITAL SIGNS: BP: 120/82  PHYSICAL EXAMINATION: Constitutional: well developed, well nourished, no acute distress Cardiovascular: rhythm was regular, normal S1 and S2, 1/6 systolic murmur; without jugular venous distention Respiratory: no respiratory distress, lungs clear to auscultation bilaterally GI: soft, non-tender, no abdominal mass palpated, no hepatosplenomegaly, bowel sounds normal Extremities: without clubbing, cyanosis, or edema Musculoskeletal: gait sufficient for exercise testing Neurologic: oriented X 3 Psych: affect normal

## 2024-03-29 NOTE — PROCEDURE
[FreeTextEntry1] :  Bone Mineral Density: 03/29/2024 Indication: Comparison to 2022, assess response to medication Spine: not performed  Total hip: -2.3, osteopenia, no significant change Femoral neck: -2.3, osteopenia, no significant change Proximal radius: -1.7, osteopenia, no significant change  Bone mineral density: 03/07/2022  Indication: vs. 2020 Spine: not performed Total hip:  -2.3 osteopenia, no significant change Femoral neck: -2.2 osteopenia, no significant change Proximal radius: -1.7 osteopenia, no significant change  Bone mineral density: 02/12/2020  Indication: vs. 2018 Spine: not performed Total hip: -2.1 osteopenia, no significant change Femoral neck: -2.1 osteopenia, +8.6% Proximal radius: -1.5 osteopenia, +3.5%  Bone mineral density 1/17/18 indication: compare to 2015  spine n/a  total hip -2.3 osteopenia, no significant change , +11.6% vs 2011 femoral neck -2.6 osteoporosis, no significant change vs 2015, +10.0% vs 2011 proximal radius -1.9 osteopenia, no significant change +4.7% vs 2011  bone mineral density test performed 12/11/15 Spine -0.9, normal,  +3.5% vs 2013 Total hip -2.3, osteopenia, +5.6% versus 2013 femoral neck -2.4, osteopenia, 6.8% versus 2013 Proximal radius -1.9 , osteopenia, No significant change versus 2013

## 2024-03-29 NOTE — HISTORY OF PRESENT ILLNESS
[Denosumab (Prolia)] : Denosumab [Risedronate (Actonel)] : Risedronate [FreeTextEntry1] :  Pt returns for a follow-up visit for osteoporosis. Pt had a fall without sustaining any fractures. Pt c/o of shoulder discomfort. No major surgeries, hospitalizations, fractures or changes in medication. The patient was evaluated after that by rheumatology who recommended shoulder x-ray but this has not been done. Up to date with dentist. No major dental work planned.   s/p Bronch, Left VATS, lingulectomy with hilar and mediastinal lymph node sampling on 8/23/22. 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 Grade 3 (>20% complex or micropapillary); 1.3 cm; All margins and LN (0/19) negative; pT1b pN0 No chemoRx or RT  Prior Actonel use for many years.On Prolia since 11/2011. Tolerating well. No thigh pain, no interval fx. Normal Ca. Last DDS within past 6 months. No ONJ. Not planning major dental work. BMD 12/2015 improved. BMD 2016 and 2018 stable. 1/2018 R wrist fx after fall on broken concrete, in cast until she had a thumb tendon rupture and surgery April 3rd 2018, followed by PT. Pt reports broken hand 12/2019, fall from standing height. BMD 2/2020 indicated stable osteopenia in total hip and improving osteopenia in femoral neck and proximal radius.  seeing ophtho for waviness, ? diplopia, prior cataract surgery

## 2024-04-09 LAB
ALBUMIN SERPL ELPH-MCNC: 4.6 G/DL
ALP BLD-CCNC: 62 U/L
ALT SERPL-CCNC: 15 U/L
ANION GAP SERPL CALC-SCNC: 13 MMOL/L
AST SERPL-CCNC: 21 U/L
BILIRUB SERPL-MCNC: 0.5 MG/DL
BUN SERPL-MCNC: 20 MG/DL
CALCIUM SERPL-MCNC: 9.5 MG/DL
CHLORIDE SERPL-SCNC: 104 MMOL/L
CHOLEST SERPL-MCNC: 214 MG/DL
CO2 SERPL-SCNC: 24 MMOL/L
CREAT SERPL-MCNC: 0.78 MG/DL
EGFR: 75 ML/MIN/1.73M2
GLUCOSE SERPL-MCNC: 92 MG/DL
HDLC SERPL-MCNC: 106 MG/DL
LDLC SERPL CALC-MCNC: 88 MG/DL
LDLC SERPL DIRECT ASSAY-MCNC: 91 MG/DL
NONHDLC SERPL-MCNC: 109 MG/DL
POTASSIUM SERPL-SCNC: 4.4 MMOL/L
PROT SERPL-MCNC: 7.1 G/DL
SODIUM SERPL-SCNC: 141 MMOL/L
TRIGL SERPL-MCNC: 121 MG/DL

## 2024-04-15 ENCOUNTER — LABORATORY RESULT (OUTPATIENT)
Age: 84
End: 2024-04-15

## 2024-04-15 ENCOUNTER — NON-APPOINTMENT (OUTPATIENT)
Age: 84
End: 2024-04-15

## 2024-04-15 ENCOUNTER — APPOINTMENT (OUTPATIENT)
Dept: INTERNAL MEDICINE | Facility: CLINIC | Age: 84
End: 2024-04-15
Payer: MEDICARE

## 2024-04-15 VITALS
BODY MASS INDEX: 29.46 KG/M2 | SYSTOLIC BLOOD PRESSURE: 123 MMHG | WEIGHT: 179 LBS | HEART RATE: 79 BPM | HEIGHT: 65.25 IN | TEMPERATURE: 97.9 F | DIASTOLIC BLOOD PRESSURE: 75 MMHG | OXYGEN SATURATION: 97 %

## 2024-04-15 DIAGNOSIS — E78.5 HYPERLIPIDEMIA, UNSPECIFIED: ICD-10-CM

## 2024-04-15 DIAGNOSIS — R26.89 OTHER ABNORMALITIES OF GAIT AND MOBILITY: ICD-10-CM

## 2024-04-15 DIAGNOSIS — Z00.00 ENCOUNTER FOR GENERAL ADULT MEDICAL EXAMINATION W/OUT ABNORMAL FINDINGS: ICD-10-CM

## 2024-04-15 DIAGNOSIS — J44.9 CHRONIC OBSTRUCTIVE PULMONARY DISEASE, UNSPECIFIED: ICD-10-CM

## 2024-04-15 PROCEDURE — G0439: CPT

## 2024-04-15 PROCEDURE — 36415 COLL VENOUS BLD VENIPUNCTURE: CPT

## 2024-04-15 NOTE — HISTORY OF PRESENT ILLNESS
[FreeTextEntry1] : Patient presents for annual well visit and to establish care.  [de-identified] : 82 y/o female with PMH of  HLD, osteoporosis, COPD, and PMR presents for annual wellness visit and to establish care. reports overall good health. denies chest pain, SOB, change in bowel habits or lost of appetite.  reports poor balance at times --pending neurology eval denies joint pain, tremors, or decreased sensation in extremities.  Patient able to go upstairs with no issues and denies hx of falls or memory loss.   Endorses ability to complete ADLS & iADLS independently. - Health measures up to date.

## 2024-04-15 NOTE — ASSESSMENT
[FreeTextEntry1] : -BP is stable. Continue current medications.  .-Check A1c, lipid panel and Vitamin levels  -Continue to f/u with Specialists.  -UTD with vaccine. -RTO in 6 months.

## 2024-04-15 NOTE — REVIEW OF SYSTEMS
[Negative] : Heme/Lymph [Headache] : no headache [Dizziness] : no dizziness [Fainting] : no fainting [Memory Loss] : no memory loss [Unsteady Walking] : no ataxia [FreeTextEntry3] : double vision [de-identified] : poor balance

## 2024-04-15 NOTE — HEALTH RISK ASSESSMENT
[Good] : ~his/her~  mood as  good [Yes] : Yes [2 - 4 times a month (2 pts)] : 2-4 times a month (2 points) [1 or 2 (0 pts)] : 1 or 2 (0 points) [Never (0 pts)] : Never (0 points) [No falls in past year] : Patient reported no falls in the past year [None] : None [Alone] : lives alone [Feels Safe at Home] : Feels safe at home [Fully functional (bathing, dressing, toileting, transferring, walking, feeding)] : Fully functional (bathing, dressing, toileting, transferring, walking, feeding) [Fully functional (using the telephone, shopping, preparing meals, housekeeping, doing laundry, using] : Fully functional and needs no help or supervision to perform IADLs (using the telephone, shopping, preparing meals, housekeeping, doing laundry, using transportation, managing medications and managing finances) [Reports normal functional visual acuity (ie: able to read med bottle)] : Reports normal functional visual acuity [Smoke Detector] : smoke detector [Carbon Monoxide Detector] : carbon monoxide detector [Safety elements used in home] : safety elements used in home [Seat Belt] :  uses seat belt [Sunscreen] : uses sunscreen [TB Exposure] : is being exposed to tuberculosis [Former] : Former [FreeTextEntry1] : [facility]  [Audit-CScore] : 2 [LowDoseCTScan] : 12/23 [Change in mental status noted] : No change in mental status noted [Language] : denies difficulty with language [Behavior] : denies difficulty with behavior [Learning/Retaining New Information] : denies difficulty learning/retaining new information [Handling Complex Tasks] : denies difficulty handling complex tasks [Reasoning] : denies difficulty with reasoning [Spatial Ability and Orientation] : denies difficulty with spatial ability and orientation [High Risk Behavior] : no high risk behavior [Reports changes in hearing] : Reports no changes in hearing [Reports changes in vision] : Reports no changes in vision [Guns at Home] : no guns at home [Travel to Developing Areas] : does not  travel to developing areas [Caregiver Concerns] : does not have caregiver concerns [MammogramDate] : 10/23 [BoneDensityDate] : 03/24 [de-identified] : use of bilateral hearing aids

## 2024-04-22 ENCOUNTER — NON-APPOINTMENT (OUTPATIENT)
Age: 84
End: 2024-04-22

## 2024-04-22 LAB
25(OH)D3 SERPL-MCNC: 27.7 NG/ML
ALBUMIN SERPL ELPH-MCNC: 4.5 G/DL
ALP BLD-CCNC: 65 U/L
ALT SERPL-CCNC: 9 U/L
ANION GAP SERPL CALC-SCNC: 14 MMOL/L
APPEARANCE: CLEAR
AST SERPL-CCNC: 21 U/L
BASOPHILS # BLD AUTO: 0.04 K/UL
BASOPHILS NFR BLD AUTO: 0.6 %
BILIRUB SERPL-MCNC: 0.4 MG/DL
BILIRUBIN URINE: NEGATIVE
BLOOD URINE: NEGATIVE
BUN SERPL-MCNC: 22 MG/DL
CALCIUM SERPL-MCNC: 9.7 MG/DL
CHLORIDE SERPL-SCNC: 105 MMOL/L
CHOLEST SERPL-MCNC: 211 MG/DL
CO2 SERPL-SCNC: 22 MMOL/L
COLOR: YELLOW
CREAT SERPL-MCNC: 0.8 MG/DL
EGFR: 73 ML/MIN/1.73M2
EOSINOPHIL # BLD AUTO: 0.19 K/UL
EOSINOPHIL NFR BLD AUTO: 2.8 %
ESTIMATED AVERAGE GLUCOSE: 108 MG/DL
GLUCOSE QUALITATIVE U: NEGATIVE MG/DL
GLUCOSE SERPL-MCNC: 84 MG/DL
HBA1C MFR BLD HPLC: 5.4 %
HCT VFR BLD CALC: 45.6 %
HDLC SERPL-MCNC: 97 MG/DL
HGB BLD-MCNC: 14.7 G/DL
IMM GRANULOCYTES NFR BLD AUTO: 0.3 %
KETONES URINE: NEGATIVE MG/DL
LDLC SERPL CALC-MCNC: 95 MG/DL
LEUKOCYTE ESTERASE URINE: ABNORMAL
LYMPHOCYTES # BLD AUTO: 1.93 K/UL
LYMPHOCYTES NFR BLD AUTO: 28.6 %
MAN DIFF?: NORMAL
MCHC RBC-ENTMCNC: 29 PG
MCHC RBC-ENTMCNC: 32.2 GM/DL
MCV RBC AUTO: 89.9 FL
MONOCYTES # BLD AUTO: 0.63 K/UL
MONOCYTES NFR BLD AUTO: 9.3 %
NEUTROPHILS # BLD AUTO: 3.95 K/UL
NEUTROPHILS NFR BLD AUTO: 58.4 %
NITRITE URINE: NEGATIVE
NONHDLC SERPL-MCNC: 114 MG/DL
PH URINE: 5.5
PLATELET # BLD AUTO: 238 K/UL
POTASSIUM SERPL-SCNC: 4.4 MMOL/L
PROT SERPL-MCNC: 6.7 G/DL
PROTEIN URINE: NEGATIVE MG/DL
RBC # BLD: 5.07 M/UL
RBC # FLD: 14.1 %
SODIUM SERPL-SCNC: 140 MMOL/L
SPECIFIC GRAVITY URINE: 1.02
TRIGL SERPL-MCNC: 111 MG/DL
TSH SERPL-ACNC: 2.09 UIU/ML
UROBILINOGEN URINE: 0.2 MG/DL
VIT B12 SERPL-MCNC: 539 PG/ML
WBC # FLD AUTO: 6.76 K/UL

## 2024-06-08 ENCOUNTER — APPOINTMENT (OUTPATIENT)
Dept: CT IMAGING | Facility: CLINIC | Age: 84
End: 2024-06-08
Payer: MEDICARE

## 2024-06-08 ENCOUNTER — OUTPATIENT (OUTPATIENT)
Dept: OUTPATIENT SERVICES | Facility: HOSPITAL | Age: 84
LOS: 1 days | End: 2024-06-08
Payer: MEDICARE

## 2024-06-08 DIAGNOSIS — Z98.890 OTHER SPECIFIED POSTPROCEDURAL STATES: Chronic | ICD-10-CM

## 2024-06-08 DIAGNOSIS — C34.92 MALIGNANT NEOPLASM OF UNSPECIFIED PART OF LEFT BRONCHUS OR LUNG: ICD-10-CM

## 2024-06-08 DIAGNOSIS — Z00.8 ENCOUNTER FOR OTHER GENERAL EXAMINATION: ICD-10-CM

## 2024-06-08 DIAGNOSIS — Z90.49 ACQUIRED ABSENCE OF OTHER SPECIFIED PARTS OF DIGESTIVE TRACT: Chronic | ICD-10-CM

## 2024-06-08 DIAGNOSIS — Z98.49 CATARACT EXTRACTION STATUS, UNSPECIFIED EYE: Chronic | ICD-10-CM

## 2024-06-08 PROCEDURE — 71250 CT THORAX DX C-: CPT

## 2024-06-08 PROCEDURE — 71250 CT THORAX DX C-: CPT | Mod: 26

## 2024-06-14 ENCOUNTER — APPOINTMENT (OUTPATIENT)
Dept: PODIATRY | Facility: CLINIC | Age: 84
End: 2024-06-14
Payer: MEDICARE

## 2024-06-14 DIAGNOSIS — L60.3 NAIL DYSTROPHY: ICD-10-CM

## 2024-06-14 DIAGNOSIS — M79.675 PAIN IN RIGHT TOE(S): ICD-10-CM

## 2024-06-14 DIAGNOSIS — M79.674 PAIN IN RIGHT TOE(S): ICD-10-CM

## 2024-06-14 DIAGNOSIS — L60.0 INGROWING NAIL: ICD-10-CM

## 2024-06-14 DIAGNOSIS — H91.90 UNSPECIFIED HEARING LOSS, UNSPECIFIED EAR: ICD-10-CM

## 2024-06-14 DIAGNOSIS — L85.1 ACQUIRED KERATOSIS [KERATODERMA] PALMARIS ET PLANTARIS: ICD-10-CM

## 2024-06-14 PROCEDURE — 11721 DEBRIDE NAIL 6 OR MORE: CPT

## 2024-06-14 PROCEDURE — 99203 OFFICE O/P NEW LOW 30 MIN: CPT | Mod: 25

## 2024-06-14 RX ORDER — AZITHROMYCIN 250 MG/1
250 TABLET, FILM COATED ORAL
Qty: 6 | Refills: 0 | Status: COMPLETED | COMMUNITY
Start: 2022-10-15 | End: 2024-06-14

## 2024-06-14 RX ORDER — CLINDAMYCIN HYDROCHLORIDE 300 MG/1
300 CAPSULE ORAL
Qty: 40 | Refills: 0 | Status: COMPLETED | COMMUNITY
Start: 2022-10-05 | End: 2024-06-14

## 2024-06-14 RX ORDER — DEXAMETHASONE 6 MG/1
6 TABLET ORAL
Qty: 10 | Refills: 0 | Status: COMPLETED | COMMUNITY
Start: 2022-10-15 | End: 2024-06-14

## 2024-06-14 RX ORDER — CHROMIUM 200 MCG
TABLET ORAL
Refills: 0 | Status: COMPLETED | COMMUNITY
End: 2024-06-14

## 2024-06-14 RX ORDER — KETOROLAC TROMETHAMINE 10 MG/1
10 TABLET, FILM COATED ORAL
Qty: 12 | Refills: 0 | Status: COMPLETED | COMMUNITY
Start: 2022-10-15 | End: 2024-06-14

## 2024-06-14 RX ORDER — PREDNISONE 1 MG/1
1 TABLET ORAL
Qty: 120 | Refills: 0 | Status: COMPLETED | COMMUNITY
Start: 2023-05-01 | End: 2024-06-14

## 2024-06-14 RX ORDER — NITROGLYCERIN 0.3 MG/1
0.3 TABLET SUBLINGUAL
Qty: 30 | Refills: 1 | Status: COMPLETED | COMMUNITY
Start: 2017-07-27 | End: 2024-06-14

## 2024-06-18 PROBLEM — L85.1 PLANTAR KERATOSIS, ACQUIRED: Status: ACTIVE | Noted: 2024-06-17

## 2024-06-18 PROBLEM — L60.0 INGROWN NAIL: Status: ACTIVE | Noted: 2024-06-17

## 2024-06-18 PROBLEM — M79.674 PAIN IN TOES OF BOTH FEET: Status: ACTIVE | Noted: 2024-06-17

## 2024-06-18 PROBLEM — L60.3 NAIL DYSTROPHY: Status: ACTIVE | Noted: 2024-06-17

## 2024-06-18 NOTE — PHYSICAL EXAM
[1+] : left foot posterior tibialis 1+ [2+] : left foot dorsalis pedis 2+ [Ankle Swelling (On Exam)] : not present [Varicose Veins Of Lower Extremities] : not present [FreeTextEntry3] : CFT: 3 seconds x 10.  Temperature gradient: warm to cool.  [de-identified] : ROM of all pedal joints intact, non-painful, non-crepitant without any joint effusions or erythema.  Muscle power: 5/5, all pedal groups.  [FreeTextEntry1] : 5.07 Wana-Trevor monofilament wire is intact.

## 2024-06-18 NOTE — ASSESSMENT
[FreeTextEntry1] : Impression: Pain in toes, bilateral (M79.674).  Onychodystrophy (L60.3).  Ingrown nail (L60.0).  Submet. 1 callus (L85.1).  Treatment: Discussed etiology and treatment options.   For the submet. 1 callus advised patient to use off-loading pads, which were provided for her.  Explained to her how to use the pad.  Wear supportive, well-padded shoe gear.   Avoid barefoot walking.  Using a moisturizer after showering. Submet. 1 callus was wiped with alcohol and shaved with a sterile #15 blade with relief in symptoms.   For onychodystrophy, advised patient to wear fitted shoe gear.   Nails 1 through 5, bilaterally were debrided of excessive thickness and length to appropriate levels of comfort and contour using sterile nippers and Dremel.   All incurvated nail edges were resected via slant back with no underlying clinical signs of infection.  The procedure was tolerated well without complications.   Return: 2.5 months.

## 2024-06-18 NOTE — HISTORY OF PRESENT ILLNESS
[FreeTextEntry1] : Patient's presents today seeking a new podiatrist as her previous doctor, Dr. Verduzco, has retired.  He was seeing her for thickened, elongated nails, which she cannot cut and right submet. 1 callus.  Patient states that the symptoms have been present for several years, only relieved by cutting of the nails and shaving of the callus.  She has changed shoe gear to supportive shoes; she walks daily for exercise.

## 2024-06-26 ENCOUNTER — APPOINTMENT (OUTPATIENT)
Dept: THORACIC SURGERY | Facility: CLINIC | Age: 84
End: 2024-06-26
Payer: MEDICARE

## 2024-06-26 ENCOUNTER — NON-APPOINTMENT (OUTPATIENT)
Age: 84
End: 2024-06-26

## 2024-06-26 VITALS
BODY MASS INDEX: 28.93 KG/M2 | SYSTOLIC BLOOD PRESSURE: 128 MMHG | RESPIRATION RATE: 17 BRPM | HEIGHT: 66 IN | WEIGHT: 180 LBS | DIASTOLIC BLOOD PRESSURE: 88 MMHG | OXYGEN SATURATION: 98 % | HEART RATE: 68 BPM

## 2024-06-26 DIAGNOSIS — C34.92 MALIGNANT NEOPLASM OF UNSPECIFIED PART OF LEFT BRONCHUS OR LUNG: ICD-10-CM

## 2024-06-26 PROCEDURE — 99213 OFFICE O/P EST LOW 20 MIN: CPT

## 2024-06-26 NOTE — HISTORY OF PRESENT ILLNESS
[FreeTextEntry1] : Ms. DUSTIN NEIL, 83 year old female,former smoker (1 PPD x 30 years; Quit 1990), w/ hx of COPD, HLD Osteoporosis (Last Prolia injection March, 2022), Dilated thoracic aorta and pulmonary nodules. Found to have lobulated nodule in the lingula.   Now, s/p Bronch, Left VATS, lingulectomy with hilar and mediastinal lymph node sampling on 8/23/22. 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 Grade 3 (>20% complex or micropapillary); 1.3 cm; All margins and LN (0/19) negative; pT1b pN0  CT Chest on 11/28/2022: - Status post lingulectomy.  - Resolved small bilateral pleural effusions and left pneumothorax.  - Mild biapical scarring is noted.  - Centrilobular emphysema is present. - Small hiatal hernia.  PFTs on 01/12/2023: FVC 2.96, 102%; FEV1 2.07, 96%; DLCO 12.6, 62%  CT Chest on 06/01/2023:  - Lingulectomy with stable postoperative changes.  - Biapical scarring.  - Dilated ascending thoracic aorta measuring 4.3 cm unchanged.  CT Chest on 12/09/2023: - Status post wedge resection in the left upper lobe. - 0.8 cm nodular opacity in the peripheral portion of the left upper lobe is unchanged when compared to previous exam.  - Scarring at both lung apices is also unchanged when compared to previous exam.  CT Chest on 06/08/2024: - Lingulectomy with stable postoperative changes.  - Scarring at the lung apices unchanged. - No new nodule. - Subcentimeter hepatic hypodensity too small to characterize, but unchanged. Left renal cyst.  Today, he is here for 6 month follow up. Pt reports doing well, denies SOB, cough or CP.

## 2024-06-26 NOTE — ASSESSMENT
[FreeTextEntry1] : Ms. DUSTIN NEIL, 83 year old female,former smoker (1 PPD x 30 years; Quit 1990), w/ hx of COPD, HLD Osteoporosis (Last Prolia injection March, 2022), Dilated thoracic aorta and pulmonary nodules. Found to have lobulated nodule in the lingula.   Now, s/p Bronch, Left VATS, lingulectomy with hilar and mediastinal lymph node sampling on 8/23/22. 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 Grade 3 (>20% complex or micropapillary); 1.3 cm; All margins and LN (0/19) negative; pT1b pN0  CT reviewed with pt, stable scan. I would like to see pt back in 1 yr with CT Chest w/o contrast.   I, TENZIN Carreon, personally performed the evaluation and management (E/M) services for this established patient who presents today with (a) new problem(s)/exacerbation of (an) existing condition(s).  That E/M includes conducting the examination, assessing all new/exacerbated conditions, and establishing a new plan of care.  Today, my ACP, MICHAEL Merchant was here to observe my evaluation and management services for this new problem/exacerbated condition to be followed going forward.

## 2024-07-12 ENCOUNTER — APPOINTMENT (OUTPATIENT)
Dept: CARDIOLOGY | Facility: CLINIC | Age: 84
End: 2024-07-12

## 2024-07-26 ENCOUNTER — APPOINTMENT (OUTPATIENT)
Dept: NEUROLOGY | Facility: CLINIC | Age: 84
End: 2024-07-26

## 2024-07-30 ENCOUNTER — APPOINTMENT (OUTPATIENT)
Dept: CARDIOLOGY | Facility: CLINIC | Age: 84
End: 2024-07-30
Payer: MEDICARE

## 2024-07-30 ENCOUNTER — NON-APPOINTMENT (OUTPATIENT)
Age: 84
End: 2024-07-30

## 2024-07-30 VITALS
BODY MASS INDEX: 29.86 KG/M2 | HEART RATE: 77 BPM | DIASTOLIC BLOOD PRESSURE: 80 MMHG | SYSTOLIC BLOOD PRESSURE: 112 MMHG | WEIGHT: 185 LBS | OXYGEN SATURATION: 95 %

## 2024-07-30 DIAGNOSIS — I27.20 PULMONARY HYPERTENSION, UNSPECIFIED: ICD-10-CM

## 2024-07-30 DIAGNOSIS — R06.09 OTHER FORMS OF DYSPNEA: ICD-10-CM

## 2024-07-30 DIAGNOSIS — I77.810 THORACIC AORTIC ECTASIA: ICD-10-CM

## 2024-07-30 DIAGNOSIS — J44.9 CHRONIC OBSTRUCTIVE PULMONARY DISEASE, UNSPECIFIED: ICD-10-CM

## 2024-07-30 DIAGNOSIS — E78.5 HYPERLIPIDEMIA, UNSPECIFIED: ICD-10-CM

## 2024-07-30 PROCEDURE — 99215 OFFICE O/P EST HI 40 MIN: CPT | Mod: 25

## 2024-07-30 PROCEDURE — 93000 ELECTROCARDIOGRAM COMPLETE: CPT

## 2024-07-30 PROCEDURE — 93306 TTE W/DOPPLER COMPLETE: CPT

## 2024-07-30 NOTE — ASSESSMENT
[FreeTextEntry1] : Hyperlipidemia with high HDL History of pulmonary hypertension-PA pressures were normal on current study-likely related to her COPD Dilated ascending aorta and proximal transverse.  No significant change (4.3 cm ascending aortic dimension on CAT scan last month).

## 2024-07-30 NOTE — REASON FOR VISIT
[FreeTextEntry1] : ECG: Normal sinus rhythm 66 bpm.  QRS axis -30 degrees.  Tall R wave V2.  Essentially within normal limits.

## 2024-07-30 NOTE — PHYSICAL EXAM
[TextEntry] : GEN: pleasant, well-developed. No distress. No icterus or pallor. HEENT: Normal conjunctiva and mucous membranes; no xanthelasma. NECK: No JVD. 2+ equal carotid upstrokes without bruits. No thyromegaly. CHEST: Left VATS scars.  Lungs clear to percussion and auscultation. CARDIAC: PMI not palpable. S1 and S2 normal, 1/6 basal systolic ejection murmur.  No, gallop, click or rub ABDOMEN: Soft, nontender, no organomegaly. The abdominal aorta is not palpable. No bruits. EXTREMITIES: No cyanosis clubbing or edema. Pedal pulses are 1+ and equal NEURO: Alert and oriented x3. Recent memory appears normal . No focal weakness. No gait or speech disturbance.

## 2024-07-30 NOTE — HISTORY OF PRESENT ILLNESS
[FreeTextEntry1] : 83-year-old woman, patient of Dr. Vlad Mead and more recently Dr. Nuñez but now presents upon Dr. Cho's [facility] for ongoing cardiac care.  She reports that she is feeling well.  Longstanding unchanged mild dyspnea with 2 flights.  No associated symptoms and recovers quickly.  Comfortable on level ground.  Recently limited by plantar fasciitis.  No c/o chest, throat,jaw, arm or upper back discomfort.  No dyspnea, orthopnea or PND.  No palpitations, dizziness or syncope.  No edema or claudication.  Her background is remarkable for aortic root dilatation, tricuspid regurgitation, pulm hypertension, hyperlipidemia and adenocarcinoma of the left lung status post VATS procedure.  Her history also includes COPD (former smoker) and PMR.  2 glasses of wine nightly.

## 2024-07-30 NOTE — PLAN
[TextEntry] : All medications and supplements reviewed and verified with patient; no changes. Activity encouraged as her plantar fasciitis permits Reduce wine intake (she does not plan to make any changes in this regard) Annual CT of the chest Annual echo

## 2024-07-30 NOTE — REVIEW OF SYSTEMS
[TextEntry] : GEN: Denies appetite change, unusual weakness, bleeding, fever, chills, recent trauma or infection. Weight has been stable after substantial increase. HEENT: no vision change or epistaxis NECK: No history of thyroid disease LUNGS: No cough or hemoptysis GASTROINTESTINAL: No abdominal pain, nausea, vomiting, hematemesis, diarrhea, constipation, hematochezia, or melena. : No dysuria or frequency. No hematuria. Nocturia x 2-3 SKIN: Denies rash, easy bruising or pruritus. BJE:  no back pain. No cramps or myalgias. NEURO: Denies headache or vertigo. No amaurosis. No new focal motor, sensory or speech symptoms. PSYCH: No anxiety or depression.

## 2024-08-01 ENCOUNTER — APPOINTMENT (OUTPATIENT)
Dept: OTOLARYNGOLOGY | Facility: CLINIC | Age: 84
End: 2024-08-01

## 2024-08-16 ENCOUNTER — APPOINTMENT (OUTPATIENT)
Dept: PODIATRY | Facility: CLINIC | Age: 84
End: 2024-08-16

## 2024-09-10 DIAGNOSIS — Z12.31 ENCOUNTER FOR SCREENING MAMMOGRAM FOR MALIGNANT NEOPLASM OF BREAST: ICD-10-CM

## 2024-09-30 ENCOUNTER — APPOINTMENT (OUTPATIENT)
Dept: ENDOCRINOLOGY | Facility: CLINIC | Age: 84
End: 2024-09-30
Payer: MEDICARE

## 2024-09-30 DIAGNOSIS — M81.0 AGE-RELATED OSTEOPOROSIS W/OUT CURRENT PATHOLOGICAL FRACTURE: ICD-10-CM

## 2024-09-30 PROCEDURE — 96372 THER/PROPH/DIAG INJ SC/IM: CPT

## 2024-09-30 RX ORDER — DENOSUMAB 60 MG/ML
60 INJECTION SUBCUTANEOUS
Qty: 1 | Refills: 0 | Status: COMPLETED | OUTPATIENT
Start: 2024-09-27

## 2024-10-04 ENCOUNTER — APPOINTMENT (OUTPATIENT)
Dept: ENDOCRINOLOGY | Facility: CLINIC | Age: 84
End: 2024-10-04

## 2024-10-10 ENCOUNTER — NON-APPOINTMENT (OUTPATIENT)
Age: 84
End: 2024-10-10

## 2024-10-10 ENCOUNTER — APPOINTMENT (OUTPATIENT)
Dept: OTOLARYNGOLOGY | Facility: CLINIC | Age: 84
End: 2024-10-10
Payer: MEDICARE

## 2024-10-10 VITALS
HEART RATE: 84 BPM | HEIGHT: 66 IN | SYSTOLIC BLOOD PRESSURE: 128 MMHG | DIASTOLIC BLOOD PRESSURE: 78 MMHG | BODY MASS INDEX: 29.73 KG/M2 | WEIGHT: 185 LBS

## 2024-10-10 DIAGNOSIS — H61.22 IMPACTED CERUMEN, LEFT EAR: ICD-10-CM

## 2024-10-10 DIAGNOSIS — H90.3 SENSORINEURAL HEARING LOSS, BILATERAL: ICD-10-CM

## 2024-10-10 DIAGNOSIS — H93.90 UNSPECIFIED DISORDER OF EAR, UNSPECIFIED EAR: ICD-10-CM

## 2024-10-10 DIAGNOSIS — H72.02 CENTRAL PERFORATION OF TYMPANIC MEMBRANE, LEFT EAR: ICD-10-CM

## 2024-10-10 DIAGNOSIS — H69.93 UNSPECIFIED EUSTACHIAN TUBE DISORDER, BILATERAL: ICD-10-CM

## 2024-10-10 PROCEDURE — 99204 OFFICE O/P NEW MOD 45 MIN: CPT | Mod: 25

## 2024-10-10 PROCEDURE — 92504 EAR MICROSCOPY EXAMINATION: CPT | Mod: LT

## 2024-10-11 PROBLEM — H72.02 CENTRAL PERFORATION OF TYMPANIC MEMBRANE, LEFT EAR: Status: ACTIVE | Noted: 2024-10-11

## 2024-10-11 PROBLEM — H69.93 ETD (EUSTACHIAN TUBE DYSFUNCTION), BILATERAL: Status: ACTIVE | Noted: 2024-10-11

## 2024-10-11 PROBLEM — H90.3 SENSORY HEARING LOSS, BILATERAL: Status: ACTIVE | Noted: 2024-10-11

## 2024-10-11 PROBLEM — H61.22 EXCESSIVE EAR WAX, LEFT: Status: ACTIVE | Noted: 2024-10-11

## 2024-10-17 NOTE — PATIENT PROFILE ADULT - NSPRESCRALCFREQ_GEN_A_NUR
CHIEF COMPLAINT:     Chief Complaint   Patient presents with    Rash     Around 2 weeks, rash above left knee, and both ankles, itcy, red, bumpy just on ankles  OTC hydrocortisone, aloe          HPI:    Melonie Coker is a 59 year old female who presents for evaluation of a rash.  Per patient rash started in the past 2 weeks. Itchy scaly rash to bilateral ankles and left knee. The affected areas are not painful, have not changed in size since onset of symptoms. Treated with aloe, hydrocortisone, desonide with no improvement. Pt has hx of psoriasis, psoriatic arthritis,  on methotrexate and cosentyx. States tried to get appt with dermatologist but there was no availability today or tomorrow. She is leaving for a trip to [place] early next week and hoping to find something that helps with the rash and pruritus.      Current Outpatient Medications   Medication Sig Dispense Refill    betamethasone valerate 0.1 % External Ointment Apply 1 Application topically 2 (two) times daily for 7 days. 45 g 0    metroNIDAZOLE 1 % External Gel Use twice daily to face as directed. 60 g 3    Secukinumab 150 MG/ML Subcutaneous Solution Prefilled Syringe every 30 (thirty) days.        methotrexate (RHEUMATREX) 2.5 MG Oral Tab Take 8 tablets (20 mg total) by mouth once a week.      folic acid (FOLVITE) 1 MG Oral Tab Take 800 mcg by mouth 3 (three) times daily.        albuterol 108 (90 Base) MCG/ACT Inhalation Aero Soln Inhale 2 puffs into the lungs every 4 (four) hours as needed for Wheezing or Shortness of Breath. 6.7 g 1    FLUTICASONE PROPIONATE 50 MCG/ACT Nasal Suspension SHAKE LIQUID AND USE 2 SPRAYS IN EACH NOSTRIL DAILY 48 g 0     No current facility-administered medications for this visit.      Past Medical History:    Arthritis    BRCA1 negative    BRCA2 negative    Breast cancer (HCC)    [facility]    Lump or mass in breast    CYST    Psoriatic arthropathy (HCC)      Past Surgical History:   Procedure Laterality Date    Colonoscopy N/A  2017    Procedure: COLONOSCOPY, POSSIBLE BIOPSY, POSSIBLE POLYPECTOMY 46023;  Surgeon: Skyler Strong MD;  Location: [facility]    Hx breast cancer      [facility]    Lumpectomy right  13    w/ wire loc, SLN bx/ [facility]    Needle biopsy right  2012    Right breast 9 o'clock position 4 cm from the nipple pathology reported invasive ductal carcinoma          x2    Other surgical history Right 2019    Right shoulder rotator cuff. Dr Yang    Radiation right      33 DOSES 2013      Family History   Problem Relation Age of Onset    Breast Cancer Self     Cancer Mother         PANCREATIC    Heart Disorder Father         CABG,AAA    Cancer Father         LUNG    Cancer Maternal Grandmother         Colon    Colon Cancer Maternal Grandmother 60    Cancer Paternal Grandmother     Breast Cancer Maternal Aunt 40        in her 40's and again in her 70's    Breast Cancer 2nd occurrence Maternal Aunt 75        70's      Social History     Socioeconomic History    Marital status:     Number of children: 2   Tobacco Use    Smoking status: Former     Current packs/day: 0.00     Types: Cigarettes     Quit date: 2004     Years since quittin.1    Smokeless tobacco: Never    Tobacco comments:     mostly social   Vaping Use    Vaping status: Never Used   Substance and Sexual Activity    Alcohol use: Yes     Alcohol/week: 1.0 standard drink of alcohol     Types: 1 Standard drinks or equivalent per week     Comment: 7-10    Drug use: No    Sexual activity: Yes   Other Topics Concern    Caffeine Concern Yes     Comment: 1 cup coffee daily    Exercise Yes     Comment: 3 x a week.     Seat Belt Yes    Self-Exams No         REVIEW OF SYSTEMS:   GENERAL: feels well otherwise, no fever, no chills.  SKIN: Per HPI. No edema. No ulcerations.  HEENT: Denies rhinorrhea, edema of the lips or swelling of throat.  CARDIOVASCULAR: Denies chest pains or palpitations.  LUNGS: Denies shortness of breath with exertion or  rest. No cough or wheezing.  LYMPH: Denies enlargement of the lymph nodes.  NEURO: Denies abnormal sensation, tingling of the skin, or numbness.      EXAM:   /88   Pulse 83   Temp 98 °F (36.7 °C)   Resp 16   Ht 5' 7\" (1.702 m)   Wt 194 lb (88 kg)   LMP 07/02/2013   SpO2 98%   BMI 30.38 kg/m²   GENERAL: well developed, well nourished,in no apparent distress  SKIN: erythematous plaques with fine scale noted to right anterior ankle, left lateral ankle, left knee. Areas are not tender to touch.   EYES: PERRLA, EOMI, conjunctivae are clear  HENT: Head atraumatic, normocephalic.   LUNGS: Clear to auscultation bilaterally.    CARDIO: RRR without murmur      ASSESSMENT AND PLAN:   Melonie Coker is a 59 year old female who presents for evaluation of a rash. Findings are consistent with:    ASSESSMENT:  Encounter Diagnosis   Name Primary?    Psoriasis Yes       PLAN: Meds as listed below.  Comfort measures as described in Patient Instructions.  Skin care discussed with patient.   Advised follow up with dermatology in the next week. To follow up sooner for new/ worsening symptoms.     Meds & Refills for this Visit:  Requested Prescriptions     Signed Prescriptions Disp Refills    betamethasone valerate 0.1 % External Ointment 45 g 0     Sig: Apply 1 Application topically 2 (two) times daily for 7 days.         Risk and benefits of medication discussed.     The patient indicates understanding of these issues and agrees to the plan.  The patient is asked to return in 3 days if sx persist or worsen    4 or more times a week

## 2024-10-21 ENCOUNTER — APPOINTMENT (OUTPATIENT)
Dept: OTOLARYNGOLOGY | Facility: CLINIC | Age: 84
End: 2024-10-21

## 2024-12-24 PROBLEM — F10.90 ALCOHOL USE: Status: ACTIVE | Noted: 2019-03-19

## 2025-01-27 ENCOUNTER — NON-APPOINTMENT (OUTPATIENT)
Age: 85
End: 2025-01-27

## 2025-01-27 ENCOUNTER — APPOINTMENT (OUTPATIENT)
Dept: CARDIOLOGY | Facility: CLINIC | Age: 85
End: 2025-01-27
Payer: MEDICARE

## 2025-01-27 VITALS
SYSTOLIC BLOOD PRESSURE: 118 MMHG | WEIGHT: 185 LBS | BODY MASS INDEX: 29.73 KG/M2 | OXYGEN SATURATION: 99 % | HEART RATE: 74 BPM | HEIGHT: 66 IN | DIASTOLIC BLOOD PRESSURE: 78 MMHG

## 2025-01-27 DIAGNOSIS — E78.5 HYPERLIPIDEMIA, UNSPECIFIED: ICD-10-CM

## 2025-01-27 DIAGNOSIS — I77.810 THORACIC AORTIC ECTASIA: ICD-10-CM

## 2025-01-27 PROCEDURE — 93000 ELECTROCARDIOGRAM COMPLETE: CPT

## 2025-01-27 PROCEDURE — 99214 OFFICE O/P EST MOD 30 MIN: CPT | Mod: 25

## 2025-04-01 ENCOUNTER — APPOINTMENT (OUTPATIENT)
Dept: ENDOCRINOLOGY | Facility: CLINIC | Age: 85
End: 2025-04-01
Payer: MEDICARE

## 2025-04-01 VITALS
SYSTOLIC BLOOD PRESSURE: 120 MMHG | OXYGEN SATURATION: 96 % | HEART RATE: 79 BPM | DIASTOLIC BLOOD PRESSURE: 78 MMHG | WEIGHT: 185 LBS | BODY MASS INDEX: 29.73 KG/M2 | HEIGHT: 66 IN

## 2025-04-01 DIAGNOSIS — M81.0 AGE-RELATED OSTEOPOROSIS W/OUT CURRENT PATHOLOGICAL FRACTURE: ICD-10-CM

## 2025-04-01 DIAGNOSIS — E78.5 HYPERLIPIDEMIA, UNSPECIFIED: ICD-10-CM

## 2025-04-01 PROCEDURE — 96372 THER/PROPH/DIAG INJ SC/IM: CPT

## 2025-04-01 PROCEDURE — 99214 OFFICE O/P EST MOD 30 MIN: CPT | Mod: 25

## 2025-04-01 RX ORDER — DENOSUMAB 60 MG/ML
60 INJECTION SUBCUTANEOUS
Qty: 1 | Refills: 0 | Status: COMPLETED | OUTPATIENT
Start: 2025-04-01

## 2025-04-01 RX ADMIN — DENOSUMAB 60 MG/ML: 60 INJECTION SUBCUTANEOUS at 00:00

## 2025-04-03 LAB
25(OH)D3 SERPL-MCNC: 28.9 NG/ML
ALBUMIN SERPL ELPH-MCNC: 4.3 G/DL
ALP BLD-CCNC: 71 U/L
ALT SERPL-CCNC: 13 U/L
ANION GAP SERPL CALC-SCNC: 14 MMOL/L
AST SERPL-CCNC: 20 U/L
BILIRUB SERPL-MCNC: 0.5 MG/DL
BUN SERPL-MCNC: 16 MG/DL
CALCIUM SERPL-MCNC: 9.3 MG/DL
CALCIUM SERPL-MCNC: 9.3 MG/DL
CHLORIDE SERPL-SCNC: 105 MMOL/L
CHOLEST SERPL-MCNC: 205 MG/DL
CO2 SERPL-SCNC: 24 MMOL/L
CREAT SERPL-MCNC: 0.85 MG/DL
EGFRCR SERPLBLD CKD-EPI 2021: 67 ML/MIN/1.73M2
GLUCOSE SERPL-MCNC: 86 MG/DL
HCT VFR BLD CALC: 46.7 %
HDLC SERPL-MCNC: 94 MG/DL
HGB BLD-MCNC: 15.1 G/DL
LDLC SERPL-MCNC: 92 MG/DL
MCHC RBC-ENTMCNC: 29.2 PG
MCHC RBC-ENTMCNC: 32.3 G/DL
MCV RBC AUTO: 90.2 FL
NONHDLC SERPL-MCNC: 111 MG/DL
PARATHYROID HORMONE INTACT: 30 PG/ML
PHOSPHATE SERPL-MCNC: 3.5 MG/DL
PLATELET # BLD AUTO: 225 K/UL
POTASSIUM SERPL-SCNC: 4.2 MMOL/L
PROT SERPL-MCNC: 6.9 G/DL
RBC # BLD: 5.18 M/UL
RBC # FLD: 13.6 %
SODIUM SERPL-SCNC: 143 MMOL/L
TRIGL SERPL-MCNC: 108 MG/DL
WBC # FLD AUTO: 6.45 K/UL

## 2025-06-12 ENCOUNTER — APPOINTMENT (OUTPATIENT)
Dept: CARDIOLOGY | Facility: CLINIC | Age: 85
End: 2025-06-12
Payer: MEDICARE

## 2025-06-12 VITALS
HEART RATE: 68 BPM | HEIGHT: 66 IN | WEIGHT: 185 LBS | BODY MASS INDEX: 29.73 KG/M2 | OXYGEN SATURATION: 97 % | DIASTOLIC BLOOD PRESSURE: 72 MMHG | SYSTOLIC BLOOD PRESSURE: 110 MMHG

## 2025-06-12 PROCEDURE — 99214 OFFICE O/P EST MOD 30 MIN: CPT | Mod: 25

## 2025-06-12 PROCEDURE — 93306 TTE W/DOPPLER COMPLETE: CPT

## 2025-06-12 PROCEDURE — 93000 ELECTROCARDIOGRAM COMPLETE: CPT

## 2025-06-21 ENCOUNTER — OUTPATIENT (OUTPATIENT)
Dept: OUTPATIENT SERVICES | Facility: HOSPITAL | Age: 85
LOS: 1 days | End: 2025-06-21
Payer: MEDICARE

## 2025-06-21 ENCOUNTER — APPOINTMENT (OUTPATIENT)
Dept: CT IMAGING | Facility: CLINIC | Age: 85
End: 2025-06-21

## 2025-06-21 DIAGNOSIS — Z90.49 ACQUIRED ABSENCE OF OTHER SPECIFIED PARTS OF DIGESTIVE TRACT: Chronic | ICD-10-CM

## 2025-06-21 DIAGNOSIS — Z98.49 CATARACT EXTRACTION STATUS, UNSPECIFIED EYE: Chronic | ICD-10-CM

## 2025-06-21 DIAGNOSIS — Z98.890 OTHER SPECIFIED POSTPROCEDURAL STATES: Chronic | ICD-10-CM

## 2025-06-21 DIAGNOSIS — C34.92 MALIGNANT NEOPLASM OF UNSPECIFIED PART OF LEFT BRONCHUS OR LUNG: ICD-10-CM

## 2025-06-21 PROCEDURE — 71250 CT THORAX DX C-: CPT

## 2025-06-21 PROCEDURE — 71250 CT THORAX DX C-: CPT | Mod: 26

## 2025-07-09 ENCOUNTER — APPOINTMENT (OUTPATIENT)
Dept: THORACIC SURGERY | Facility: CLINIC | Age: 85
End: 2025-07-09
Payer: MEDICARE

## 2025-07-09 VITALS
HEIGHT: 66 IN | HEART RATE: 73 BPM | DIASTOLIC BLOOD PRESSURE: 74 MMHG | OXYGEN SATURATION: 95 % | SYSTOLIC BLOOD PRESSURE: 110 MMHG | WEIGHT: 185 LBS | BODY MASS INDEX: 29.73 KG/M2

## 2025-07-09 PROCEDURE — 99213 OFFICE O/P EST LOW 20 MIN: CPT

## 2025-07-09 RX ORDER — MULTIVIT-MIN/IRON/FOLIC ACID/K 18-600-40
CAPSULE ORAL
Refills: 0 | Status: ACTIVE | COMMUNITY

## 2025-07-09 RX ORDER — BIOTIN 10 MG
TABLET ORAL
Refills: 0 | Status: ACTIVE | COMMUNITY

## 2025-08-14 DIAGNOSIS — R92.30 DENSE BREASTS, UNSPECIFIED: ICD-10-CM

## 2025-08-14 DIAGNOSIS — R92.1 MAMMOGRAPHIC CALCIFICATION FOUND ON DIAGNOSTIC IMAGING OF BREAST: ICD-10-CM

## 2025-09-08 ENCOUNTER — APPOINTMENT (OUTPATIENT)
Dept: CARDIOLOGY | Facility: CLINIC | Age: 85
End: 2025-09-08
Payer: MEDICARE

## 2025-09-08 PROCEDURE — 93351 STRESS TTE COMPLETE: CPT

## (undated) DEVICE — GRASPER LAPA 5MMX35CM

## (undated) DEVICE — WARMING BLANKET UPPER ADULT

## (undated) DEVICE — XI ARM FORCEP CADIERE 8MM

## (undated) DEVICE — NDL HYPO REGULAR BEVEL 22G X 1.5" (TURQUOISE)

## (undated) DEVICE — ENDOCATCH GENERAL 15MM (PURPLE)

## (undated) DEVICE — XI ARM FORCEP FENESTRATED BIPOLAR 8MM

## (undated) DEVICE — SUT PDS II 2-0 27" SH

## (undated) DEVICE — XI ARM PERMANENT CAUTERY SPATULA

## (undated) DEVICE — DRAPE INSTRUMENT POUCH 6.75" X 11"

## (undated) DEVICE — STAPLER COVIDIEN ENDO GIA STANDARD HANDLE

## (undated) DEVICE — XI ARM GRASPER TIP UP FENESTRATED

## (undated) DEVICE — XI ARM GRASPER BIPOLAR LONG 8MM

## (undated) DEVICE — PACK ROBOTIC LIJ

## (undated) DEVICE — BLADE SURGICAL #15 CARBON

## (undated) DEVICE — FOLEY TRAY 16FR 5CC LTX UMETER CLOSED

## (undated) DEVICE — SUT POLYSORB 2-0 30" V-20 UNDYED

## (undated) DEVICE — GOWN XL

## (undated) DEVICE — XI 12MM AND STAPLER CANNULA SEAL

## (undated) DEVICE — XI ARM CLIP APPLIER LARGE

## (undated) DEVICE — TUBING SUCTION 20FT

## (undated) DEVICE — DRSG CURITY GAUZE SPONGE 4 X 4" 12-PLY

## (undated) DEVICE — VENODYNE/SCD SLEEVE CALF MEDIUM

## (undated) DEVICE — SUT MONOCRYL 4-0 27" PS-2 UNDYED

## (undated) DEVICE — BLADE SURGICAL #10 STAINLESS

## (undated) DEVICE — XI DRAPE COLUMN

## (undated) DEVICE — SOL IRR POUR NS 0.9% 500ML

## (undated) DEVICE — D HELP - CLEARVIEW CLEARIFY SYSTEM

## (undated) DEVICE — XI DRAPE ARM

## (undated) DEVICE — POSITIONER FOAM HEAD CRADLE (PINK)

## (undated) DEVICE — DRAPE LIGHT HANDLE COVER (GREEN)

## (undated) DEVICE — TROCAR COVIDIEN VERSASTEP PLUS 15MM STANDARD

## (undated) DEVICE — XI STAPLER ENDOWRIST 30 CURVED TIP

## (undated) DEVICE — XI SEAL UNIV 5- 8 MM

## (undated) DEVICE — TUBING STRYKEFLOW II SUCTION / IRRIGATOR

## (undated) DEVICE — XI STAPLER SUREFORM 60

## (undated) DEVICE — SPECIMEN CONTAINER 100ML

## (undated) DEVICE — SUT SURGIPRO 0 30" GS-22

## (undated) DEVICE — SUT VICRYL 0 27" UR-6

## (undated) DEVICE — ENDOCATCH GENERAL 10MM (PURPLE)

## (undated) DEVICE — TUBING OLYMPUS INSUFFLATION

## (undated) DEVICE — XI ENDOWRIST STAPLER SHEATH

## (undated) DEVICE — TROCAR COVIDIEN VERSAPORT PLUS BLADELESS FIXATION 15MM

## (undated) DEVICE — DRSG TEGADERM 2.5X3"

## (undated) DEVICE — XI ENDOWRIST 12 - 8 MM CANNULA REDUCER

## (undated) DEVICE — WARMING BLANKET LOWER ADULT

## (undated) DEVICE — SUT SILK 0 24" SH DA

## (undated) DEVICE — XI OBTURATOR OPTICAL BLADELESS 8MM

## (undated) DEVICE — SUT POLYSORB 4-0 P-12 UNDYED

## (undated) DEVICE — CHEST DRAIN OASIS DRY SUCTION WATER SEAL

## (undated) DEVICE — DRSG GAUZE PACKTNER ROLL

## (undated) DEVICE — ELCTR BOVIE TIP BLADE INSULATED 2.75" EDGE